# Patient Record
Sex: MALE | Race: WHITE | Employment: OTHER | URBAN - METROPOLITAN AREA
[De-identification: names, ages, dates, MRNs, and addresses within clinical notes are randomized per-mention and may not be internally consistent; named-entity substitution may affect disease eponyms.]

---

## 2017-08-29 PROBLEM — M43.16 SPONDYLOLISTHESIS OF LUMBAR REGION: Status: ACTIVE | Noted: 2017-08-29

## 2018-12-06 ENCOUNTER — TELEPHONE (OUTPATIENT)
Dept: SURGERY | Age: 63
End: 2018-12-06

## 2018-12-06 ENCOUNTER — INITIAL CONSULT (OUTPATIENT)
Dept: SURGERY | Age: 63
End: 2018-12-06
Payer: MEDICARE

## 2018-12-06 VITALS
HEIGHT: 72 IN | SYSTOLIC BLOOD PRESSURE: 147 MMHG | HEART RATE: 89 BPM | DIASTOLIC BLOOD PRESSURE: 84 MMHG | BODY MASS INDEX: 25.77 KG/M2

## 2018-12-06 DIAGNOSIS — K63.89 COLONIC MASS: Primary | ICD-10-CM

## 2018-12-06 PROCEDURE — 1123F ACP DISCUSS/DSCN MKR DOCD: CPT | Performed by: SURGERY

## 2018-12-06 PROCEDURE — 1101F PT FALLS ASSESS-DOCD LE1/YR: CPT | Performed by: SURGERY

## 2018-12-06 PROCEDURE — 4040F PNEUMOC VAC/ADMIN/RCVD: CPT | Performed by: SURGERY

## 2018-12-06 PROCEDURE — G8484 FLU IMMUNIZE NO ADMIN: HCPCS | Performed by: SURGERY

## 2018-12-06 PROCEDURE — 99204 OFFICE O/P NEW MOD 45 MIN: CPT | Performed by: SURGERY

## 2018-12-06 PROCEDURE — G8427 DOCREV CUR MEDS BY ELIG CLIN: HCPCS | Performed by: SURGERY

## 2018-12-06 PROCEDURE — 3017F COLORECTAL CA SCREEN DOC REV: CPT | Performed by: SURGERY

## 2018-12-06 PROCEDURE — G8417 CALC BMI ABV UP PARAM F/U: HCPCS | Performed by: SURGERY

## 2018-12-06 PROCEDURE — 4004F PT TOBACCO SCREEN RCVD TLK: CPT | Performed by: SURGERY

## 2018-12-06 RX ORDER — TRAMADOL HYDROCHLORIDE 50 MG/1
50 TABLET ORAL EVERY 6 HOURS PRN
COMMUNITY
End: 2020-02-19 | Stop reason: ALTCHOICE

## 2018-12-06 RX ORDER — LOSARTAN POTASSIUM 50 MG/1
50 TABLET ORAL DAILY
COMMUNITY

## 2018-12-06 RX ORDER — RANITIDINE 300 MG/1
300 CAPSULE ORAL EVERY EVENING
COMMUNITY

## 2018-12-06 RX ORDER — DIPHENHYDRAMINE HCL 25 MG
25 TABLET ORAL DAILY PRN
COMMUNITY

## 2018-12-06 RX ORDER — HYDROCODONE BITARTRATE AND ACETAMINOPHEN 10; 325 MG/1; MG/1
1 TABLET ORAL EVERY 6 HOURS PRN
COMMUNITY

## 2018-12-06 RX ORDER — PANTOPRAZOLE SODIUM 40 MG/1
40 TABLET, DELAYED RELEASE ORAL EVERY MORNING
COMMUNITY

## 2018-12-06 RX ORDER — ICOSAPENT ETHYL 1000 MG/1
2 CAPSULE ORAL 2 TIMES DAILY
COMMUNITY
End: 2020-02-19 | Stop reason: ALTCHOICE

## 2018-12-06 NOTE — PROGRESS NOTES
 Number of children: N/A    Years of education: N/A     Occupational History    Not on file.      Social History Main Topics    Smoking status: Current Every Day Smoker     Packs/day: 1.00     Years: 47.00     Types: Cigarettes, Cigars    Smokeless tobacco: Never Used      Comment: 1/2 pack daily/2 cigars daily    Alcohol use Yes      Comment: Moderate    Drug use: No    Sexual activity: Not on file     Other Topics Concern    Not on file     Social History Narrative    No narrative on file           Review of Systems  Review of Systems -  General ROS: negative for - chills, fatigue or malaise  ENT ROS: negative for - hearing change, nasal congestion or nasal discharge  Allergy and Immunology ROS: negative for - hives, itchy/watery eyes or nasal congestion  Hematological and Lymphatic ROS: negative for - blood clots, blood transfusions, bruising or fatigue  Endocrine ROS: negative for - malaise/lethargy, mood swings, palpitations or polydipsia/polyuria  Respiratory ROS: negative for - sputum changes, stridor, tachypnea or wheezing  Cardiovascular ROS: negative for - irregular heartbeat, loss of consciousness, murmur or orthopnea  Gastrointestinal ROS: negative for - constipation, diarrhea, gas/bloating, heartburn or hematemesis  Genito-Urinary ROS: negative for -  genital discharge, genital ulcers or hematuria  Musculoskeletal ROS: negative for - gait disturbance, muscle pain or muscular weakness    Physical exam:   BP (!) 147/84 (Site: Left Upper Arm, Position: Sitting, Cuff Size: Medium Adult)   Pulse 89   Ht 6' (1.829 m)   BMI 25.77 kg/m²   General appearance:  NAD  Pyscho/social: negative for tremors and hallucinations  Head: NCAT, PERRLA, EOMI, red conjunctiva  Neck: supple, no masses  Lungs: CTAB, equal chest rise bilateral  Heart: Reg rate  Abdomen: soft, nontender, nondistended  Skin; no lesions  Gu: no cva tenderness  Extremities: extremities normal, atraumatic, no cyanosis or

## 2018-12-10 ENCOUNTER — HOSPITAL ENCOUNTER (OUTPATIENT)
Age: 63
Discharge: HOME | End: 2018-12-10
Payer: MEDICARE

## 2018-12-10 ENCOUNTER — HOSPITAL ENCOUNTER (OUTPATIENT)
Dept: CT IMAGING | Age: 63
Discharge: HOME | End: 2018-12-10
Payer: MEDICARE

## 2018-12-10 DIAGNOSIS — K63.89 COLONIC MASS: ICD-10-CM

## 2018-12-10 LAB
ANION GAP SERPL CALCULATED.3IONS-SCNC: 11 MMOL/L (ref 7–16)
BUN BLDV-MCNC: 9 MG/DL (ref 8–23)
CALCIUM SERPL-MCNC: 9 MG/DL (ref 8.6–10.2)
CHLORIDE BLD-SCNC: 101 MMOL/L (ref 98–107)
CO2: 27 MMOL/L (ref 22–29)
CREAT SERPL-MCNC: 0.7 MG/DL (ref 0.7–1.2)
GFR AFRICAN AMERICAN: >60
GFR NON-AFRICAN AMERICAN: >60 ML/MIN/1.73
GLUCOSE BLD-MCNC: 257 MG/DL (ref 74–99)
POTASSIUM SERPL-SCNC: 3.9 MMOL/L (ref 3.5–5)
SODIUM BLD-SCNC: 139 MMOL/L (ref 132–146)

## 2018-12-10 PROCEDURE — 80048 BASIC METABOLIC PNL TOTAL CA: CPT

## 2018-12-10 PROCEDURE — 36415 COLL VENOUS BLD VENIPUNCTURE: CPT

## 2019-01-24 ENCOUNTER — OFFICE VISIT (OUTPATIENT)
Dept: SURGERY | Age: 64
End: 2019-01-24
Payer: MEDICARE

## 2019-01-24 VITALS
DIASTOLIC BLOOD PRESSURE: 84 MMHG | BODY MASS INDEX: 27.77 KG/M2 | OXYGEN SATURATION: 93 % | RESPIRATION RATE: 16 BRPM | TEMPERATURE: 98.1 F | HEIGHT: 72 IN | WEIGHT: 205 LBS | HEART RATE: 101 BPM | SYSTOLIC BLOOD PRESSURE: 142 MMHG

## 2019-01-24 DIAGNOSIS — C18.2 MALIGNANT NEOPLASM OF ASCENDING COLON (HCC): Primary | ICD-10-CM

## 2019-01-24 PROCEDURE — G8484 FLU IMMUNIZE NO ADMIN: HCPCS | Performed by: SURGERY

## 2019-01-24 PROCEDURE — 1123F ACP DISCUSS/DSCN MKR DOCD: CPT | Performed by: SURGERY

## 2019-01-24 PROCEDURE — G8428 CUR MEDS NOT DOCUMENT: HCPCS | Performed by: SURGERY

## 2019-01-24 PROCEDURE — G8417 CALC BMI ABV UP PARAM F/U: HCPCS | Performed by: SURGERY

## 2019-01-24 PROCEDURE — 99214 OFFICE O/P EST MOD 30 MIN: CPT | Performed by: SURGERY

## 2019-01-24 PROCEDURE — 1101F PT FALLS ASSESS-DOCD LE1/YR: CPT | Performed by: SURGERY

## 2019-01-24 PROCEDURE — 4040F PNEUMOC VAC/ADMIN/RCVD: CPT | Performed by: SURGERY

## 2019-01-24 PROCEDURE — 3017F COLORECTAL CA SCREEN DOC REV: CPT | Performed by: SURGERY

## 2019-01-24 PROCEDURE — 4004F PT TOBACCO SCREEN RCVD TLK: CPT | Performed by: SURGERY

## 2019-01-24 RX ORDER — ERGOCALCIFEROL 1.25 MG/1
CAPSULE ORAL
Refills: 11 | COMMUNITY
Start: 2019-01-03 | End: 2020-02-19 | Stop reason: ALTCHOICE

## 2019-01-24 RX ORDER — OMEPRAZOLE 40 MG/1
CAPSULE, DELAYED RELEASE ORAL
Refills: 4 | COMMUNITY
Start: 2019-01-03 | End: 2019-03-08

## 2019-03-04 ENCOUNTER — OFFICE VISIT (OUTPATIENT)
Dept: SURGERY | Age: 64
End: 2019-03-04
Payer: MEDICARE

## 2019-03-04 ENCOUNTER — TELEPHONE (OUTPATIENT)
Dept: SURGERY | Age: 64
End: 2019-03-04

## 2019-03-04 VITALS
WEIGHT: 190 LBS | TEMPERATURE: 97.8 F | RESPIRATION RATE: 18 BRPM | OXYGEN SATURATION: 94 % | HEART RATE: 97 BPM | SYSTOLIC BLOOD PRESSURE: 154 MMHG | BODY MASS INDEX: 25.73 KG/M2 | DIASTOLIC BLOOD PRESSURE: 78 MMHG | HEIGHT: 72 IN

## 2019-03-04 DIAGNOSIS — C18.2 MALIGNANT NEOPLASM OF ASCENDING COLON (HCC): Primary | ICD-10-CM

## 2019-03-04 PROCEDURE — 4004F PT TOBACCO SCREEN RCVD TLK: CPT | Performed by: SURGERY

## 2019-03-04 PROCEDURE — 4040F PNEUMOC VAC/ADMIN/RCVD: CPT | Performed by: SURGERY

## 2019-03-04 PROCEDURE — 3017F COLORECTAL CA SCREEN DOC REV: CPT | Performed by: SURGERY

## 2019-03-04 PROCEDURE — G8484 FLU IMMUNIZE NO ADMIN: HCPCS | Performed by: SURGERY

## 2019-03-04 PROCEDURE — 1123F ACP DISCUSS/DSCN MKR DOCD: CPT | Performed by: SURGERY

## 2019-03-04 PROCEDURE — 99213 OFFICE O/P EST LOW 20 MIN: CPT | Performed by: SURGERY

## 2019-03-04 PROCEDURE — 1101F PT FALLS ASSESS-DOCD LE1/YR: CPT | Performed by: SURGERY

## 2019-03-04 PROCEDURE — G8428 CUR MEDS NOT DOCUMENT: HCPCS | Performed by: SURGERY

## 2019-03-04 PROCEDURE — G8417 CALC BMI ABV UP PARAM F/U: HCPCS | Performed by: SURGERY

## 2019-03-04 RX ORDER — ERYTHROMYCIN 500 MG/1
500 TABLET, COATED ORAL 3 TIMES DAILY
Qty: 3 TABLET | Refills: 0 | Status: SHIPPED | OUTPATIENT
Start: 2019-03-04 | End: 2019-03-05

## 2019-03-04 RX ORDER — NEOMYCIN SULFATE 500 MG/1
1000 TABLET ORAL 3 TIMES DAILY
Qty: 6 TABLET | Refills: 0 | Status: SHIPPED | OUTPATIENT
Start: 2019-03-04 | End: 2019-03-05

## 2019-03-05 ENCOUNTER — TELEPHONE (OUTPATIENT)
Dept: SURGERY | Age: 64
End: 2019-03-05

## 2019-03-05 ENCOUNTER — PREP FOR PROCEDURE (OUTPATIENT)
Dept: SURGERY | Age: 64
End: 2019-03-05

## 2019-03-05 RX ORDER — SODIUM CHLORIDE, SODIUM LACTATE, POTASSIUM CHLORIDE, CALCIUM CHLORIDE 600; 310; 30; 20 MG/100ML; MG/100ML; MG/100ML; MG/100ML
INJECTION, SOLUTION INTRAVENOUS CONTINUOUS
Status: CANCELLED | OUTPATIENT
Start: 2019-03-05

## 2019-03-05 RX ORDER — SODIUM CHLORIDE 0.9 % (FLUSH) 0.9 %
10 SYRINGE (ML) INJECTION EVERY 12 HOURS SCHEDULED
Status: CANCELLED | OUTPATIENT
Start: 2019-03-05

## 2019-03-05 RX ORDER — SODIUM CHLORIDE 0.9 % (FLUSH) 0.9 %
10 SYRINGE (ML) INJECTION PRN
Status: CANCELLED | OUTPATIENT
Start: 2019-03-05

## 2019-03-06 ENCOUNTER — TELEPHONE (OUTPATIENT)
Dept: SURGERY | Age: 64
End: 2019-03-06

## 2019-03-08 ENCOUNTER — HOSPITAL ENCOUNTER (OUTPATIENT)
Dept: PREADMISSION TESTING | Age: 64
Discharge: HOME | End: 2019-03-08
Payer: MEDICARE

## 2019-03-08 VITALS
SYSTOLIC BLOOD PRESSURE: 120 MMHG | TEMPERATURE: 97.4 F | HEART RATE: 90 BPM | HEIGHT: 72 IN | DIASTOLIC BLOOD PRESSURE: 62 MMHG | WEIGHT: 207.13 LBS | BODY MASS INDEX: 28.05 KG/M2 | OXYGEN SATURATION: 95 % | RESPIRATION RATE: 18 BRPM

## 2019-03-08 DIAGNOSIS — Z01.818 PRE-OP TESTING: Primary | ICD-10-CM

## 2019-03-08 LAB
ANION GAP SERPL CALCULATED.3IONS-SCNC: 9 MMOL/L (ref 7–16)
BUN BLDV-MCNC: 9 MG/DL (ref 8–23)
CALCIUM SERPL-MCNC: 9.6 MG/DL (ref 8.6–10.2)
CHLORIDE BLD-SCNC: 99 MMOL/L (ref 98–107)
CO2: 28 MMOL/L (ref 22–29)
CREAT SERPL-MCNC: 0.7 MG/DL (ref 0.7–1.2)
GFR AFRICAN AMERICAN: >60
GFR NON-AFRICAN AMERICAN: >60 ML/MIN/1.73
GLUCOSE BLD-MCNC: 296 MG/DL (ref 74–99)
HCT VFR BLD CALC: 48.3 % (ref 37–54)
HEMOGLOBIN: 16.4 G/DL (ref 12.5–16.5)
MCH RBC QN AUTO: 29.5 PG (ref 26–35)
MCHC RBC AUTO-ENTMCNC: 34 % (ref 32–34.5)
MCV RBC AUTO: 87 FL (ref 80–99.9)
PDW BLD-RTO: 13.8 FL (ref 11.5–15)
PLATELET # BLD: 258 E9/L (ref 130–450)
PMV BLD AUTO: 9.3 FL (ref 7–12)
POTASSIUM REFLEX MAGNESIUM: 3.7 MMOL/L (ref 3.5–5)
RBC # BLD: 5.55 E12/L (ref 3.8–5.8)
SODIUM BLD-SCNC: 136 MMOL/L (ref 132–146)
WBC # BLD: 9.3 E9/L (ref 4.5–11.5)

## 2019-03-08 PROCEDURE — 87081 CULTURE SCREEN ONLY: CPT

## 2019-03-08 PROCEDURE — 36415 COLL VENOUS BLD VENIPUNCTURE: CPT

## 2019-03-08 PROCEDURE — 85027 COMPLETE CBC AUTOMATED: CPT

## 2019-03-08 PROCEDURE — 80048 BASIC METABOLIC PNL TOTAL CA: CPT

## 2019-03-08 RX ORDER — CYCLOBENZAPRINE HCL 10 MG
10 TABLET ORAL 3 TIMES DAILY PRN
COMMUNITY
End: 2019-09-11 | Stop reason: SDUPTHER

## 2019-03-08 ASSESSMENT — PAIN SCALES - GENERAL: PAINLEVEL_OUTOF10: 7

## 2019-03-08 ASSESSMENT — PAIN DESCRIPTION - LOCATION: LOCATION: ABDOMEN

## 2019-03-08 ASSESSMENT — PAIN DESCRIPTION - PROGRESSION: CLINICAL_PROGRESSION: GRADUALLY WORSENING

## 2019-03-08 ASSESSMENT — PAIN DESCRIPTION - ONSET: ONSET: ON-GOING

## 2019-03-08 ASSESSMENT — PAIN DESCRIPTION - FREQUENCY: FREQUENCY: CONTINUOUS

## 2019-03-08 ASSESSMENT — PAIN DESCRIPTION - DESCRIPTORS: DESCRIPTORS: ACHING;CONSTANT

## 2019-03-08 ASSESSMENT — PAIN - FUNCTIONAL ASSESSMENT: PAIN_FUNCTIONAL_ASSESSMENT: PREVENTS OR INTERFERES WITH MANY ACTIVE NOT PASSIVE ACTIVITIES

## 2019-03-08 ASSESSMENT — PAIN DESCRIPTION - ORIENTATION: ORIENTATION: LOWER;MID

## 2019-03-10 LAB — MRSA CULTURE ONLY: NORMAL

## 2019-03-12 ENCOUNTER — TELEPHONE (OUTPATIENT)
Dept: SURGERY | Age: 64
End: 2019-03-12

## 2019-03-12 LAB
EKG ATRIAL RATE: 88 BPM
EKG P AXIS: 60 DEGREES
EKG P-R INTERVAL: 206 MS
EKG Q-T INTERVAL: 388 MS
EKG QRS DURATION: 78 MS
EKG QTC CALCULATION (BAZETT): 469 MS
EKG R AXIS: -28 DEGREES
EKG T AXIS: 17 DEGREES
EKG VENTRICULAR RATE: 88 BPM

## 2019-03-27 ENCOUNTER — TELEPHONE (OUTPATIENT)
Dept: SURGERY | Age: 64
End: 2019-03-27

## 2019-04-18 ENCOUNTER — TELEPHONE (OUTPATIENT)
Dept: ADMINISTRATIVE | Age: 64
End: 2019-04-18

## 2019-04-25 ENCOUNTER — TELEPHONE (OUTPATIENT)
Dept: SURGERY | Age: 64
End: 2019-04-25

## 2019-04-25 NOTE — TELEPHONE ENCOUNTER
Patient has received medical and cardiac clearance for surgery. Call placed to patient to discuss scheduling surgery for May 7, 2019, patient not available. Message left for patient to please return my call to discuss scheduling surgery. Awaiting return call from patient.

## 2019-04-29 ENCOUNTER — PREP FOR PROCEDURE (OUTPATIENT)
Dept: SURGERY | Age: 64
End: 2019-04-29

## 2019-04-29 RX ORDER — SODIUM CHLORIDE 0.9 % (FLUSH) 0.9 %
10 SYRINGE (ML) INJECTION PRN
Status: CANCELLED | OUTPATIENT
Start: 2019-04-29

## 2019-04-29 RX ORDER — CIPROFLOXACIN 2 MG/ML
400 INJECTION, SOLUTION INTRAVENOUS
Status: CANCELLED | OUTPATIENT
Start: 2019-05-07 | End: 2019-05-07

## 2019-04-29 RX ORDER — SODIUM CHLORIDE 0.9 % (FLUSH) 0.9 %
10 SYRINGE (ML) INJECTION EVERY 12 HOURS SCHEDULED
Status: CANCELLED | OUTPATIENT
Start: 2019-04-29

## 2019-04-29 RX ORDER — SODIUM CHLORIDE, SODIUM LACTATE, POTASSIUM CHLORIDE, CALCIUM CHLORIDE 600; 310; 30; 20 MG/100ML; MG/100ML; MG/100ML; MG/100ML
INJECTION, SOLUTION INTRAVENOUS CONTINUOUS
Status: CANCELLED | OUTPATIENT
Start: 2019-04-29

## 2019-05-03 ENCOUNTER — HOSPITAL ENCOUNTER (OUTPATIENT)
Dept: PREADMISSION TESTING | Age: 64
Discharge: HOME | End: 2019-05-03
Payer: MEDICARE

## 2019-05-03 VITALS
SYSTOLIC BLOOD PRESSURE: 122 MMHG | BODY MASS INDEX: 28.08 KG/M2 | WEIGHT: 207.3 LBS | DIASTOLIC BLOOD PRESSURE: 68 MMHG | TEMPERATURE: 98.1 F | OXYGEN SATURATION: 95 % | HEART RATE: 92 BPM | RESPIRATION RATE: 18 BRPM | HEIGHT: 72 IN

## 2019-05-03 DIAGNOSIS — Z01.818 PRE-OP TESTING: Primary | ICD-10-CM

## 2019-05-03 LAB
ANION GAP SERPL CALCULATED.3IONS-SCNC: 12 MMOL/L (ref 7–16)
BUN BLDV-MCNC: 18 MG/DL (ref 8–23)
CALCIUM SERPL-MCNC: 9.4 MG/DL (ref 8.6–10.2)
CHLORIDE BLD-SCNC: 102 MMOL/L (ref 98–107)
CO2: 25 MMOL/L (ref 22–29)
CREAT SERPL-MCNC: 0.9 MG/DL (ref 0.7–1.2)
GFR AFRICAN AMERICAN: >60
GFR NON-AFRICAN AMERICAN: >60 ML/MIN/1.73
GLUCOSE BLD-MCNC: 270 MG/DL (ref 74–99)
HCT VFR BLD CALC: 47.3 % (ref 37–54)
HEMOGLOBIN: 16.2 G/DL (ref 12.5–16.5)
MCH RBC QN AUTO: 29.8 PG (ref 26–35)
MCHC RBC AUTO-ENTMCNC: 34.2 % (ref 32–34.5)
MCV RBC AUTO: 87.1 FL (ref 80–99.9)
PDW BLD-RTO: 14.2 FL (ref 11.5–15)
PLATELET # BLD: 251 E9/L (ref 130–450)
PMV BLD AUTO: 9.7 FL (ref 7–12)
POTASSIUM REFLEX MAGNESIUM: 4.3 MMOL/L (ref 3.5–5)
RBC # BLD: 5.43 E12/L (ref 3.8–5.8)
SODIUM BLD-SCNC: 139 MMOL/L (ref 132–146)
WBC # BLD: 9.4 E9/L (ref 4.5–11.5)

## 2019-05-03 PROCEDURE — 80048 BASIC METABOLIC PNL TOTAL CA: CPT

## 2019-05-03 PROCEDURE — 85027 COMPLETE CBC AUTOMATED: CPT

## 2019-05-03 PROCEDURE — 36415 COLL VENOUS BLD VENIPUNCTURE: CPT

## 2019-05-03 PROCEDURE — 87081 CULTURE SCREEN ONLY: CPT

## 2019-05-03 ASSESSMENT — PAIN DESCRIPTION - PAIN TYPE: TYPE: CHRONIC PAIN

## 2019-05-03 ASSESSMENT — PAIN DESCRIPTION - LOCATION: LOCATION: ABDOMEN

## 2019-05-03 ASSESSMENT — PAIN SCALES - GENERAL: PAINLEVEL_OUTOF10: 7

## 2019-05-03 ASSESSMENT — PAIN DESCRIPTION - ORIENTATION: ORIENTATION: RIGHT;LEFT;LOWER

## 2019-05-03 NOTE — PROGRESS NOTES
[address] [facility]                                                                                                                     [address]. Date: 5/3/2019    Date of surgery: 5/7/19   Arrival Time: Hospital will call you between 5pm and 7pm with your final arrival time for surgery    1. Do not eat or drink anything after midnight prior to surgery. This includes no water, chewing gum, mints or ice chips. 2. Take the following medications with a small sip of water on the morning of Surgery: waldaryl, tramadol, daliresp      3. Diabetics may take evening dose of insulin but none after midnight. If you feel symptomatic or low blood sugar morning of surgery drink 1-2 ounces of apple juice only. 4. Aspirin, Ibuprofen, Advil, Naproxen, Vitamin E and other Anti-inflammatory products should be stopped  before surgery  as directed by your physician. Take Tylenol only unless instructed otherwise by your surgeon. 5. Check with your Doctor regarding stopping Plavix, Coumadin, Lovenox, Eliquis, Effient, or other blood thinners. 6. Do not smoke,use illicit drugs and do not drink any alcoholic beverages 24 hours prior to surgery. 7. You may brush your teeth the morning of surgery. DO NOT SWALLOW WATER    8. You MUST make arrangements for a responsible adult to take you home after your surgery. You will not be allowed to leave alone or drive yourself home. It is strongly suggested someone stay with you the first 24 hrs. Your surgery will be cancelled if you do not have a ride home. 9. PEDIATRIC PATIENTS ONLY:  A parent/legal guardian must accompany a child scheduled for surgery and plan to stay at the hospital until the child is discharged. Please do not bring other children with you.     10. Please wear simple, loose fitting clothing to the hospital.  Do not bring valuables (money, credit cards, checkbooks, etc.) Do not wear any makeup (including no eye makeup) or nail polish on your fingers or toes. 11. DO NOT wear any jewelry or piercings on day of surgery. All body piercing jewelry must be removed. 12. Shower the night before surgery with _X__Antibacterial soap /MELINA WIPES________    13. TOTAL JOINT REPLACEMENT/HYSTERECTOMY PATIENTS ONLY---Remember to bring Blood Bank bracelet to the hospital on the day of surgery. 14. If you have a Living Will and Durable Power of  for Healthcare, please bring in a copy. 15. If appropriate bring crutches, inspirex, WALKER, CANE etc... 12. Notify your Surgeon if you develop any illness between now and surgery time, cough, cold, fever, sore throat, nausea, vomiting, etc.  Please notify your surgeon if you experience dizziness, shortness of breath or blurred vision between now & the time of your surgery. 17. If you have ___dentures, they will be removed before going to the OR; we will provide you a container. If you wear ___contact lenses or ___glasses, they will be removed; please bring a case for them. 18. To provide excellent care visitors will be limited to 2 in the room at any given time. 19. Please bring picture ID and insurance card. 20. Sleep apnea patients need to bring CPAP AND SETTINGS to hospital on day of surgery. 21. During flu season no children under the age of 15 are permitted in the hospital for the safety of all patients. 22. Other Come in through main entrance, go to information desk. Please call AMBULATORY CARE if you have any further questions.    [address]     [address]

## 2019-05-04 LAB — MRSA CULTURE ONLY: NORMAL

## 2019-05-07 ENCOUNTER — ANESTHESIA (OUTPATIENT)
Dept: OPERATING ROOM | Age: 64
DRG: 330 | End: 2019-05-07
Payer: MEDICARE

## 2019-05-07 ENCOUNTER — ANESTHESIA EVENT (OUTPATIENT)
Dept: OPERATING ROOM | Age: 64
DRG: 330 | End: 2019-05-07
Payer: MEDICARE

## 2019-05-07 ENCOUNTER — HOSPITAL ENCOUNTER (INPATIENT)
Age: 64
LOS: 2 days | Discharge: HOME | DRG: 330 | End: 2019-05-09
Attending: SURGERY | Admitting: SURGERY
Payer: MEDICARE

## 2019-05-07 VITALS
TEMPERATURE: 96.3 F | DIASTOLIC BLOOD PRESSURE: 72 MMHG | RESPIRATION RATE: 5 BRPM | SYSTOLIC BLOOD PRESSURE: 147 MMHG | OXYGEN SATURATION: 87 %

## 2019-05-07 PROBLEM — Z90.49 S/P COLON RESECTION: Status: ACTIVE | Noted: 2019-05-07

## 2019-05-07 LAB
METER GLUCOSE: 160 MG/DL (ref 74–99)
METER GLUCOSE: 303 MG/DL (ref 74–99)
METER GLUCOSE: 388 MG/DL (ref 74–99)
METER GLUCOSE: 450 MG/DL (ref 74–99)

## 2019-05-07 PROCEDURE — 1200000000 HC SEMI PRIVATE

## 2019-05-07 PROCEDURE — 3600000004 HC SURGERY LEVEL 4 BASE: Performed by: SURGERY

## 2019-05-07 PROCEDURE — 94640 AIRWAY INHALATION TREATMENT: CPT

## 2019-05-07 PROCEDURE — 6370000000 HC RX 637 (ALT 250 FOR IP): Performed by: INTERNAL MEDICINE

## 2019-05-07 PROCEDURE — 6370000000 HC RX 637 (ALT 250 FOR IP): Performed by: SURGERY

## 2019-05-07 PROCEDURE — 3700000001 HC ADD 15 MINUTES (ANESTHESIA): Performed by: SURGERY

## 2019-05-07 PROCEDURE — 6360000002 HC RX W HCPCS

## 2019-05-07 PROCEDURE — 82962 GLUCOSE BLOOD TEST: CPT

## 2019-05-07 PROCEDURE — 2500000003 HC RX 250 WO HCPCS: Performed by: SURGERY

## 2019-05-07 PROCEDURE — 6360000002 HC RX W HCPCS: Performed by: SURGERY

## 2019-05-07 PROCEDURE — 6360000002 HC RX W HCPCS: Performed by: NURSE ANESTHETIST, CERTIFIED REGISTERED

## 2019-05-07 PROCEDURE — 99024 POSTOP FOLLOW-UP VISIT: CPT | Performed by: SURGERY

## 2019-05-07 PROCEDURE — 88309 TISSUE EXAM BY PATHOLOGIST: CPT

## 2019-05-07 PROCEDURE — 7100000001 HC PACU RECOVERY - ADDTL 15 MIN: Performed by: SURGERY

## 2019-05-07 PROCEDURE — 3700000000 HC ANESTHESIA ATTENDED CARE: Performed by: SURGERY

## 2019-05-07 PROCEDURE — 7100000000 HC PACU RECOVERY - FIRST 15 MIN: Performed by: SURGERY

## 2019-05-07 PROCEDURE — 2500000003 HC RX 250 WO HCPCS: Performed by: NURSE ANESTHETIST, CERTIFIED REGISTERED

## 2019-05-07 PROCEDURE — 6370000000 HC RX 637 (ALT 250 FOR IP): Performed by: ANESTHESIOLOGY

## 2019-05-07 PROCEDURE — 2580000003 HC RX 258: Performed by: SURGERY

## 2019-05-07 PROCEDURE — 3600000014 HC SURGERY LEVEL 4 ADDTL 15MIN: Performed by: SURGERY

## 2019-05-07 PROCEDURE — 2709999900 HC NON-CHARGEABLE SUPPLY: Performed by: SURGERY

## 2019-05-07 PROCEDURE — 2720000010 HC SURG SUPPLY STERILE: Performed by: SURGERY

## 2019-05-07 RX ORDER — ONDANSETRON 2 MG/ML
4 INJECTION INTRAMUSCULAR; INTRAVENOUS EVERY 6 HOURS PRN
Status: DISCONTINUED | OUTPATIENT
Start: 2019-05-07 | End: 2019-05-09 | Stop reason: HOSPADM

## 2019-05-07 RX ORDER — ASPIRIN 81 MG/1
81 TABLET ORAL DAILY
Status: DISCONTINUED | OUTPATIENT
Start: 2019-05-07 | End: 2019-05-09 | Stop reason: HOSPADM

## 2019-05-07 RX ORDER — IPRATROPIUM BROMIDE AND ALBUTEROL SULFATE 2.5; .5 MG/3ML; MG/3ML
1 SOLUTION RESPIRATORY (INHALATION) ONCE
Status: COMPLETED | OUTPATIENT
Start: 2019-05-07 | End: 2019-05-07

## 2019-05-07 RX ORDER — ONDANSETRON 2 MG/ML
INJECTION INTRAMUSCULAR; INTRAVENOUS PRN
Status: DISCONTINUED | OUTPATIENT
Start: 2019-05-07 | End: 2019-05-07 | Stop reason: SDUPTHER

## 2019-05-07 RX ORDER — SODIUM CHLORIDE 0.9 % (FLUSH) 0.9 %
10 SYRINGE (ML) INJECTION EVERY 12 HOURS SCHEDULED
Status: DISCONTINUED | OUTPATIENT
Start: 2019-05-07 | End: 2019-05-09 | Stop reason: HOSPADM

## 2019-05-07 RX ORDER — HYDROCODONE BITARTRATE AND ACETAMINOPHEN 5; 325 MG/1; MG/1
1 TABLET ORAL EVERY 4 HOURS PRN
Status: DISCONTINUED | OUTPATIENT
Start: 2019-05-07 | End: 2019-05-09 | Stop reason: HOSPADM

## 2019-05-07 RX ORDER — SODIUM CHLORIDE 0.9 % (FLUSH) 0.9 %
10 SYRINGE (ML) INJECTION PRN
Status: DISCONTINUED | OUTPATIENT
Start: 2019-05-07 | End: 2019-05-09 | Stop reason: HOSPADM

## 2019-05-07 RX ORDER — CIPROFLOXACIN 2 MG/ML
400 INJECTION, SOLUTION INTRAVENOUS EVERY 12 HOURS
Status: COMPLETED | OUTPATIENT
Start: 2019-05-07 | End: 2019-05-07

## 2019-05-07 RX ORDER — AMLODIPINE BESYLATE 10 MG/1
10 TABLET ORAL DAILY
Status: DISCONTINUED | OUTPATIENT
Start: 2019-05-07 | End: 2019-05-09 | Stop reason: HOSPADM

## 2019-05-07 RX ORDER — GLYCOPYRROLATE 1 MG/5 ML
SYRINGE (ML) INTRAVENOUS PRN
Status: DISCONTINUED | OUTPATIENT
Start: 2019-05-07 | End: 2019-05-07 | Stop reason: SDUPTHER

## 2019-05-07 RX ORDER — SODIUM CHLORIDE, SODIUM LACTATE, POTASSIUM CHLORIDE, CALCIUM CHLORIDE 600; 310; 30; 20 MG/100ML; MG/100ML; MG/100ML; MG/100ML
INJECTION, SOLUTION INTRAVENOUS CONTINUOUS
Status: DISCONTINUED | OUTPATIENT
Start: 2019-05-07 | End: 2019-05-07

## 2019-05-07 RX ORDER — SODIUM CHLORIDE, SODIUM LACTATE, POTASSIUM CHLORIDE, CALCIUM CHLORIDE 600; 310; 30; 20 MG/100ML; MG/100ML; MG/100ML; MG/100ML
INJECTION, SOLUTION INTRAVENOUS CONTINUOUS
Status: DISCONTINUED | OUTPATIENT
Start: 2019-05-07 | End: 2019-05-08

## 2019-05-07 RX ORDER — ATORVASTATIN CALCIUM 40 MG/1
40 TABLET, FILM COATED ORAL NIGHTLY
Status: DISCONTINUED | OUTPATIENT
Start: 2019-05-07 | End: 2019-05-09 | Stop reason: HOSPADM

## 2019-05-07 RX ORDER — CIPROFLOXACIN 2 MG/ML
400 INJECTION, SOLUTION INTRAVENOUS
Status: COMPLETED | OUTPATIENT
Start: 2019-05-07 | End: 2019-05-07

## 2019-05-07 RX ORDER — MIDAZOLAM HYDROCHLORIDE 1 MG/ML
INJECTION INTRAMUSCULAR; INTRAVENOUS PRN
Status: DISCONTINUED | OUTPATIENT
Start: 2019-05-07 | End: 2019-05-07 | Stop reason: SDUPTHER

## 2019-05-07 RX ORDER — HYDROMORPHONE HYDROCHLORIDE 1 MG/ML
0.5 INJECTION, SOLUTION INTRAMUSCULAR; INTRAVENOUS; SUBCUTANEOUS EVERY 5 MIN PRN
Status: DISCONTINUED | OUTPATIENT
Start: 2019-05-07 | End: 2019-05-07 | Stop reason: HOSPADM

## 2019-05-07 RX ORDER — DEXTROSE MONOHYDRATE 25 G/50ML
12.5 INJECTION, SOLUTION INTRAVENOUS PRN
Status: DISCONTINUED | OUTPATIENT
Start: 2019-05-07 | End: 2019-05-09 | Stop reason: HOSPADM

## 2019-05-07 RX ORDER — MORPHINE SULFATE 2 MG/ML
2 INJECTION, SOLUTION INTRAMUSCULAR; INTRAVENOUS
Status: DISCONTINUED | OUTPATIENT
Start: 2019-05-07 | End: 2019-05-09 | Stop reason: HOSPADM

## 2019-05-07 RX ORDER — DEXTROSE MONOHYDRATE 50 MG/ML
100 INJECTION, SOLUTION INTRAVENOUS PRN
Status: DISCONTINUED | OUTPATIENT
Start: 2019-05-07 | End: 2019-05-09 | Stop reason: HOSPADM

## 2019-05-07 RX ORDER — LOSARTAN POTASSIUM 50 MG/1
50 TABLET ORAL DAILY
Status: DISCONTINUED | OUTPATIENT
Start: 2019-05-07 | End: 2019-05-09 | Stop reason: HOSPADM

## 2019-05-07 RX ORDER — BUPIVACAINE HYDROCHLORIDE AND EPINEPHRINE 2.5; 5 MG/ML; UG/ML
INJECTION, SOLUTION EPIDURAL; INFILTRATION; INTRACAUDAL; PERINEURAL PRN
Status: DISCONTINUED | OUTPATIENT
Start: 2019-05-07 | End: 2019-05-07 | Stop reason: ALTCHOICE

## 2019-05-07 RX ORDER — ICOSAPENT ETHYL 1000 MG/1
2 CAPSULE ORAL 2 TIMES DAILY
Status: DISCONTINUED | OUTPATIENT
Start: 2019-05-07 | End: 2019-05-07 | Stop reason: CLARIF

## 2019-05-07 RX ORDER — ROCURONIUM BROMIDE 10 MG/ML
INJECTION, SOLUTION INTRAVENOUS PRN
Status: DISCONTINUED | OUTPATIENT
Start: 2019-05-07 | End: 2019-05-07 | Stop reason: SDUPTHER

## 2019-05-07 RX ORDER — IPRATROPIUM BROMIDE AND ALBUTEROL SULFATE 2.5; .5 MG/3ML; MG/3ML
1 SOLUTION RESPIRATORY (INHALATION)
Status: DISCONTINUED | OUTPATIENT
Start: 2019-05-07 | End: 2019-05-09 | Stop reason: HOSPADM

## 2019-05-07 RX ORDER — CYCLOBENZAPRINE HCL 10 MG
10 TABLET ORAL 3 TIMES DAILY PRN
Status: DISCONTINUED | OUTPATIENT
Start: 2019-05-07 | End: 2019-05-09 | Stop reason: HOSPADM

## 2019-05-07 RX ORDER — HYDROCODONE BITARTRATE AND ACETAMINOPHEN 5; 325 MG/1; MG/1
2 TABLET ORAL EVERY 4 HOURS PRN
Status: DISCONTINUED | OUTPATIENT
Start: 2019-05-07 | End: 2019-05-09 | Stop reason: HOSPADM

## 2019-05-07 RX ORDER — LIDOCAINE HYDROCHLORIDE 20 MG/ML
INJECTION, SOLUTION INFILTRATION; PERINEURAL PRN
Status: DISCONTINUED | OUTPATIENT
Start: 2019-05-07 | End: 2019-05-07 | Stop reason: SDUPTHER

## 2019-05-07 RX ORDER — NICOTINE POLACRILEX 4 MG
15 LOZENGE BUCCAL PRN
Status: DISCONTINUED | OUTPATIENT
Start: 2019-05-07 | End: 2019-05-09 | Stop reason: HOSPADM

## 2019-05-07 RX ORDER — SODIUM CHLORIDE 0.9 % (FLUSH) 0.9 %
10 SYRINGE (ML) INJECTION EVERY 12 HOURS SCHEDULED
Status: DISCONTINUED | OUTPATIENT
Start: 2019-05-07 | End: 2019-05-07 | Stop reason: HOSPADM

## 2019-05-07 RX ORDER — DEXAMETHASONE SODIUM PHOSPHATE 10 MG/ML
INJECTION, SOLUTION INTRAMUSCULAR; INTRAVENOUS PRN
Status: DISCONTINUED | OUTPATIENT
Start: 2019-05-07 | End: 2019-05-07 | Stop reason: SDUPTHER

## 2019-05-07 RX ORDER — GABAPENTIN 400 MG/1
800 CAPSULE ORAL 3 TIMES DAILY
Status: DISCONTINUED | OUTPATIENT
Start: 2019-05-07 | End: 2019-05-09 | Stop reason: HOSPADM

## 2019-05-07 RX ORDER — CIPROFLOXACIN 2 MG/ML
INJECTION, SOLUTION INTRAVENOUS PRN
Status: DISCONTINUED | OUTPATIENT
Start: 2019-05-07 | End: 2019-05-07 | Stop reason: SDUPTHER

## 2019-05-07 RX ORDER — FENTANYL CITRATE 50 UG/ML
INJECTION, SOLUTION INTRAMUSCULAR; INTRAVENOUS PRN
Status: DISCONTINUED | OUTPATIENT
Start: 2019-05-07 | End: 2019-05-07 | Stop reason: SDUPTHER

## 2019-05-07 RX ORDER — MEPERIDINE HYDROCHLORIDE 50 MG/ML
12.5 INJECTION INTRAMUSCULAR; INTRAVENOUS; SUBCUTANEOUS EVERY 5 MIN PRN
Status: DISCONTINUED | OUTPATIENT
Start: 2019-05-07 | End: 2019-05-07 | Stop reason: HOSPADM

## 2019-05-07 RX ORDER — PANTOPRAZOLE SODIUM 40 MG/1
40 TABLET, DELAYED RELEASE ORAL
Status: DISCONTINUED | OUTPATIENT
Start: 2019-05-08 | End: 2019-05-09 | Stop reason: HOSPADM

## 2019-05-07 RX ORDER — OMEGA-3-ACID ETHYL ESTERS 1 G/1
2 CAPSULE, LIQUID FILLED ORAL 2 TIMES DAILY
Status: DISCONTINUED | OUTPATIENT
Start: 2019-05-07 | End: 2019-05-09 | Stop reason: HOSPADM

## 2019-05-07 RX ORDER — NEOSTIGMINE METHYLSULFATE 1 MG/ML
INJECTION, SOLUTION INTRAVENOUS PRN
Status: DISCONTINUED | OUTPATIENT
Start: 2019-05-07 | End: 2019-05-07 | Stop reason: SDUPTHER

## 2019-05-07 RX ORDER — SODIUM CHLORIDE 0.9 % (FLUSH) 0.9 %
10 SYRINGE (ML) INJECTION PRN
Status: DISCONTINUED | OUTPATIENT
Start: 2019-05-07 | End: 2019-05-07 | Stop reason: HOSPADM

## 2019-05-07 RX ORDER — ACETAMINOPHEN 325 MG/1
650 TABLET ORAL EVERY 4 HOURS PRN
Status: DISCONTINUED | OUTPATIENT
Start: 2019-05-07 | End: 2019-05-09 | Stop reason: HOSPADM

## 2019-05-07 RX ORDER — PROPOFOL 10 MG/ML
INJECTION, EMULSION INTRAVENOUS PRN
Status: DISCONTINUED | OUTPATIENT
Start: 2019-05-07 | End: 2019-05-07 | Stop reason: SDUPTHER

## 2019-05-07 RX ORDER — MORPHINE SULFATE 4 MG/ML
4 INJECTION, SOLUTION INTRAMUSCULAR; INTRAVENOUS EVERY 4 HOURS PRN
Status: DISCONTINUED | OUTPATIENT
Start: 2019-05-07 | End: 2019-05-09 | Stop reason: HOSPADM

## 2019-05-07 RX ADMIN — Medication 3 MG: at 11:00

## 2019-05-07 RX ADMIN — Medication 0.6 MG: at 11:00

## 2019-05-07 RX ADMIN — INSULIN LISPRO 4 UNITS: 100 INJECTION, SOLUTION INTRAVENOUS; SUBCUTANEOUS at 14:44

## 2019-05-07 RX ADMIN — AMLODIPINE BESYLATE 10 MG: 10 TABLET ORAL at 14:43

## 2019-05-07 RX ADMIN — ONDANSETRON HYDROCHLORIDE 4 MG: 2 INJECTION, SOLUTION INTRAMUSCULAR; INTRAVENOUS at 10:55

## 2019-05-07 RX ADMIN — Medication 50 MG: at 09:53

## 2019-05-07 RX ADMIN — METRONIDAZOLE 500 MG: 500 INJECTION, SOLUTION INTRAVENOUS at 09:50

## 2019-05-07 RX ADMIN — GABAPENTIN 800 MG: 400 CAPSULE ORAL at 20:55

## 2019-05-07 RX ADMIN — IPRATROPIUM BROMIDE AND ALBUTEROL SULFATE 1 AMPULE: .5; 3 SOLUTION RESPIRATORY (INHALATION) at 11:30

## 2019-05-07 RX ADMIN — IPRATROPIUM BROMIDE AND ALBUTEROL SULFATE 1 AMPULE: .5; 3 SOLUTION RESPIRATORY (INHALATION) at 19:19

## 2019-05-07 RX ADMIN — HYDROMORPHONE HYDROCHLORIDE 0.5 MG: 1 INJECTION, SOLUTION INTRAMUSCULAR; INTRAVENOUS; SUBCUTANEOUS at 11:40

## 2019-05-07 RX ADMIN — ATORVASTATIN CALCIUM 40 MG: 40 TABLET, FILM COATED ORAL at 20:55

## 2019-05-07 RX ADMIN — DEXAMETHASONE SODIUM PHOSPHATE 10 MG: 10 INJECTION, SOLUTION INTRAMUSCULAR; INTRAVENOUS at 09:57

## 2019-05-07 RX ADMIN — OMEGA-3-ACID ETHYL ESTERS 2 G: 1 CAPSULE, LIQUID FILLED ORAL at 20:55

## 2019-05-07 RX ADMIN — LIDOCAINE HYDROCHLORIDE 60 MG: 20 INJECTION, SOLUTION INFILTRATION; PERINEURAL at 09:53

## 2019-05-07 RX ADMIN — INSULIN LISPRO 5 UNITS: 100 INJECTION, SOLUTION INTRAVENOUS; SUBCUTANEOUS at 17:03

## 2019-05-07 RX ADMIN — METRONIDAZOLE 500 MG: 500 INJECTION, SOLUTION INTRAVENOUS at 17:13

## 2019-05-07 RX ADMIN — FENTANYL CITRATE 100 MCG: 50 INJECTION, SOLUTION INTRAMUSCULAR; INTRAVENOUS at 09:53

## 2019-05-07 RX ADMIN — INSULIN LISPRO 3 UNITS: 100 INJECTION, SOLUTION INTRAVENOUS; SUBCUTANEOUS at 20:56

## 2019-05-07 RX ADMIN — METFORMIN HYDROCHLORIDE 1000 MG: 1000 TABLET ORAL at 14:43

## 2019-05-07 RX ADMIN — METFORMIN HYDROCHLORIDE 1000 MG: 1000 TABLET ORAL at 20:55

## 2019-05-07 RX ADMIN — IPRATROPIUM BROMIDE AND ALBUTEROL SULFATE 1 AMPULE: .5; 3 SOLUTION RESPIRATORY (INHALATION) at 15:23

## 2019-05-07 RX ADMIN — CIPROFLOXACIN 400 MG: 2 INJECTION, SOLUTION INTRAVENOUS at 10:50

## 2019-05-07 RX ADMIN — MORPHINE SULFATE 4 MG: 4 INJECTION INTRAVENOUS at 17:03

## 2019-05-07 RX ADMIN — LOSARTAN POTASSIUM 50 MG: 50 TABLET ORAL at 14:44

## 2019-05-07 RX ADMIN — PROPOFOL 150 MG: 10 INJECTION, EMULSION INTRAVENOUS at 09:53

## 2019-05-07 RX ADMIN — Medication 20 MG: at 10:37

## 2019-05-07 RX ADMIN — CIPROFLOXACIN 400 MG: 2 INJECTION, SOLUTION INTRAVENOUS at 20:56

## 2019-05-07 RX ADMIN — HYDROCODONE BITARTRATE AND ACETAMINOPHEN 2 TABLET: 5; 325 TABLET ORAL at 20:55

## 2019-05-07 RX ADMIN — SODIUM CHLORIDE, POTASSIUM CHLORIDE, SODIUM LACTATE AND CALCIUM CHLORIDE: 600; 310; 30; 20 INJECTION, SOLUTION INTRAVENOUS at 09:31

## 2019-05-07 RX ADMIN — SODIUM CHLORIDE, POTASSIUM CHLORIDE, SODIUM LACTATE AND CALCIUM CHLORIDE: 600; 310; 30; 20 INJECTION, SOLUTION INTRAVENOUS at 13:13

## 2019-05-07 RX ADMIN — CIPROFLOXACIN 400 MG: 2 INJECTION, SOLUTION INTRAVENOUS at 09:34

## 2019-05-07 RX ADMIN — MIDAZOLAM 2 MG: 1 INJECTION INTRAMUSCULAR; INTRAVENOUS at 09:50

## 2019-05-07 RX ADMIN — GABAPENTIN 800 MG: 400 CAPSULE ORAL at 14:43

## 2019-05-07 ASSESSMENT — PULMONARY FUNCTION TESTS
PIF_VALUE: 23
PIF_VALUE: 28
PIF_VALUE: 22
PIF_VALUE: 17
PIF_VALUE: 23
PIF_VALUE: 22
PIF_VALUE: 20
PIF_VALUE: 28
PIF_VALUE: 33
PIF_VALUE: 20
PIF_VALUE: 23
PIF_VALUE: 22
PIF_VALUE: 27
PIF_VALUE: 23
PIF_VALUE: 28
PIF_VALUE: 28
PIF_VALUE: 1
PIF_VALUE: 23
PIF_VALUE: 23
PIF_VALUE: 28
PIF_VALUE: 21
PIF_VALUE: 27
PIF_VALUE: 28
PIF_VALUE: 24
PIF_VALUE: 36
PIF_VALUE: 1
PIF_VALUE: 23
PIF_VALUE: 23
PIF_VALUE: 41
PIF_VALUE: 15
PIF_VALUE: 27
PIF_VALUE: 28
PIF_VALUE: 22
PIF_VALUE: 23
PIF_VALUE: 23
PIF_VALUE: 21
PIF_VALUE: 20
PIF_VALUE: 21
PIF_VALUE: 27
PIF_VALUE: 20
PIF_VALUE: 38
PIF_VALUE: 30
PIF_VALUE: 23
PIF_VALUE: 22
PIF_VALUE: 3
PIF_VALUE: 27
PIF_VALUE: 22
PIF_VALUE: 0
PIF_VALUE: 15
PIF_VALUE: 25
PIF_VALUE: 23
PIF_VALUE: 1
PIF_VALUE: 0
PIF_VALUE: 23
PIF_VALUE: 37
PIF_VALUE: 28
PIF_VALUE: 22
PIF_VALUE: 23
PIF_VALUE: 27
PIF_VALUE: 26
PIF_VALUE: 23
PIF_VALUE: 23
PIF_VALUE: 38
PIF_VALUE: 2
PIF_VALUE: 21
PIF_VALUE: 44
PIF_VALUE: 20
PIF_VALUE: 23
PIF_VALUE: 39
PIF_VALUE: 27
PIF_VALUE: 21

## 2019-05-07 ASSESSMENT — PAIN DESCRIPTION - LOCATION
LOCATION: ABDOMEN
LOCATION: ABDOMEN

## 2019-05-07 ASSESSMENT — PAIN DESCRIPTION - PAIN TYPE
TYPE: SURGICAL PAIN
TYPE: SURGICAL PAIN

## 2019-05-07 ASSESSMENT — PAIN SCALES - GENERAL
PAINLEVEL_OUTOF10: 6
PAINLEVEL_OUTOF10: 9
PAINLEVEL_OUTOF10: 8
PAINLEVEL_OUTOF10: 0
PAINLEVEL_OUTOF10: 7

## 2019-05-07 ASSESSMENT — PAIN DESCRIPTION - DESCRIPTORS: DESCRIPTORS: ACHING;DISCOMFORT;TENDER

## 2019-05-07 ASSESSMENT — LIFESTYLE VARIABLES: SMOKING_STATUS: 1

## 2019-05-07 NOTE — H&P
General Surgery History and Physical     Patient's Name/Date of Birth: Richelle Freeman. / 1955     Date: 5/7/19      Surgeon: John Mckenzie M.D.     PCP: Zenobia Way MD     Chief Complaint: colon cancer     HPI:   Richelle Palma is a 75 y.o. male who presents for evaluation of colon cancer that was found on recent colonoscopy and is located at ascending colon. Does not have other symptoms.         Past Medical History        Past Medical History:   Diagnosis Date    Bilateral lower extremity edema      Chronic back pain      Depression      Diabetes mellitus (HCC)      GERD (gastroesophageal reflux disease)      Headache(784.0)      Hyperlipidemia      Hypertension      Migraines      Neck pain, chronic      Numbness and tingling       neck & back    Osteoarthritis      Type II or unspecified type diabetes mellitus without mention of complication, not stated as uncontrolled      Unspecified sleep apnea       CPAP at times    Vitamin D deficiency              Past Surgical History   Past Surgical History:   Procedure Laterality Date    BACK SURGERY   37 yrs ago     [facility]5 discs[facility] Dian[facility] OTHER SURGICAL HISTORY   2/15/12     vocal cord  surgery  by Dr. Radha Terrazas at [address] N/A 1/29/2014     Scar revision with flap reapir of left nasal tip    RHINOPLASTY        ROTATOR CUFF REPAIR         [facility] cuff at [facility] by Dr. Silver Gruber            Current Facility-Administered Medications          Current Outpatient Medications   Medication Sig Dispense Refill    neomycin (MYCIFRADIN) 500 MG tablet Take 2 tablets by mouth 3 times daily for 1 day Take at 1300, 1400 and midnight the day before surgery, use these instructions instead of the ones above.  6 tablet 0    erythromycin base (E-MYCIN) 500 MG tablet Take 1 tablet by mouth 3 times daily for 1 day Take at 1300, 1400 and midnight the day        atorvastatin (LIPITOR) 40 MG tablet Take 40 mg by mouth nightly.          hydrochlorothiazide (HYDRODIURIL) 25 MG tablet Take 25 mg by mouth daily.          gabapentin (NEURONTIN) 800 MG tablet    Take 800 mg by mouth 3 times daily           No current facility-administered medications for this visit.                  Allergies   Allergen Reactions    Benzonatate Hives    Procaine Hcl Hives    Roxicet [Oxycodone-Acetaminophen] Hives    Dye [Barium-Containing Compounds]           The patient has a family history that is negative for severe cardiovascular or respiratory issues, negative for reaction to anesthesia.     Social History               Socioeconomic History    Marital status:        Spouse name: Not on file    Number of children: Not on file    Years of education: Not on file    Highest education level: Not on file   Occupational History    Not on file   Social Needs    Financial resource strain: Not on file    Food insecurity:       Worry: Not on file       Inability: Not on file    Transportation needs:       Medical: Not on file       Non-medical: Not on file   Tobacco Use    Smoking status: Current Every Day Smoker       Packs/day: 0.50       Years: 47.00       Pack years: 23.50       Types: Cigarettes, Cigars    Smokeless tobacco: Never Used    Tobacco comment: 1/2 pack daily/2 cigars daily   Substance and Sexual Activity    Alcohol use: No    Drug use: No    Sexual activity: Not on file   Lifestyle    Physical activity:       Days per week: Not on file       Minutes per session: Not on file    Stress: Not on file   Relationships    Social connections:       Talks on phone: Not on file       Gets together: Not on file       Attends Mosque service: Not on file       Active member of club or organization: Not on file       Attends meetings of clubs or organizations: Not on file       Relationship status: Not on file    Intimate partner violence:       Fear of current or ex partner: Not on file       Emotionally abused: Not on file       Physically abused: Not on file       Forced sexual activity: Not on file   Other Topics Concern    Not on file   Social History Narrative    Not on file                  Review of Systems  Review of Systems -  General ROS: negative for - chills, fatigue or malaise  ENT ROS: negative for - hearing change, nasal congestion or nasal discharge  Allergy and Immunology ROS: negative for - hives, itchy/watery eyes or nasal congestion  Hematological and Lymphatic ROS: negative for - blood clots, blood transfusions, bruising or fatigue  Endocrine ROS: negative for - malaise/lethargy, mood swings, palpitations or polydipsia/polyuria  Respiratory ROS: negative for - sputum changes, stridor, tachypnea or wheezing  Cardiovascular ROS: negative for - irregular heartbeat, loss of consciousness, murmur or orthopnea  Gastrointestinal ROS: negative for - constipation, diarrhea, gas/bloating, heartburn or hematemesis  Genito-Urinary ROS: negative for -  genital discharge, genital ulcers or hematuria  Musculoskeletal ROS: negative for - gait disturbance, muscle pain or muscular weakness     Physical exam:   There were no vitals taken for this visit. General appearance:  NAD  Pyscho/social: negative for tremors and hallucinations  Head: NCAT, PERRLA, EOMI, red conjunctiva  Neck: supple, no masses  Lungs: CTAB, equal chest rise bilateral  Heart: Reg rate  Abdomen: soft, nontender, nondistended  Skin; no lesions  Gu: no cva tenderness  Extremities: extremities normal, atraumatic, no cyanosis or edema        Assessment:  75 y.o. male with colon cancer and head and neck cancer     Plan:   Will plan for lap resection possible open possible ostomy  Discussed the risk, benefits and alternatives of surgery including wound infections, bleeding, scar and hernia formation and the risks of general anesthetic including MI, CVA, sudden death or reactions to anesthetic medications. The patient understands the risks and alternatives and the possibility of converting to an open procedure.  All questions were answered to the patient's satisfaction and they freely signed the consent.        Shelley Zazueta MD

## 2019-05-07 NOTE — ANESTHESIA POSTPROCEDURE EVALUATION
Department of Anesthesiology  Postprocedure Note    Patient: Travis Richardson MRN: [id number]  YOB: 1955  Date of evaluation: 5/7/2019  Time:  11:35 AM     Procedure Summary     Date:  05/07/19 Room / Location:  [address] [place] [address] 03 / [address] [address] [place] [address]    Anesthesia Start:  9750 Anesthesia Stop:  1110    Procedure:  LAPAROSCOPIC RIGHT HEMICOLECTOMY (Right ) Diagnosis:  (COLON CANCER)    Surgeon:  Javan Caraballo MD Responsible Provider:  Eliseo Quispe MD    Anesthesia Type:  general ASA Status:  3          Anesthesia Type: general    Sabra Phase I: Sabra Score: 6    Sabra Phase II:      Last vitals: Reviewed and per EMR flowsheets.        Anesthesia Post Evaluation    Patient location during evaluation: PACU  Patient participation: complete - patient participated  Level of consciousness: awake  Pain score: 0  Airway patency: patent  Nausea & Vomiting: no nausea  Complications: no  Cardiovascular status: blood pressure returned to baseline  Respiratory status: acceptable  Hydration status: euvolemic

## 2019-05-07 NOTE — PLAN OF CARE
Problem: Falls - Risk of:  Goal: Will remain free from falls  Description  Will remain free from falls  Outcome: Completed     Problem: Safety:  Goal: Free from accidental physical injury  Description  Free from accidental physical injury  Outcome: Met This Shift     Problem: Pain:  Goal: Patient's pain/discomfort is manageable  Description  Patient's pain/discomfort is manageable  Outcome: Met This Shift

## 2019-05-07 NOTE — ANESTHESIA PRE PROCEDURE
Department of Anesthesiology  Preprocedure Note       Name:  Marshall Leija. Age:  75 y.o.  :  1955                                          MRN:  [id number]         Date:  2019      Surgeon: Kev Gastelum):  Marito Wood MD    Procedure: RIGHT HEMICOLECTOMY POSS OPEN LAPAROSCOPIC (Right )    Medications prior to admission:   Prior to Admission medications    Medication Sig Start Date End Date Taking? Authorizing Provider   OXYGEN Inhale into the lungs Use as needed-  Mostly at night if needed, 31/2 liters    Historical Provider, MD   traMADol (ULTRAM) 50 MG tablet Take 1 tablet by mouth every 8 hours as needed for Pain for up to 30 days. Take lowest dose possible to manage pain 19  Janki Kahn MD   cyclobenzaprine (FLEXERIL) 10 MG tablet Take 10 mg by mouth 3 times daily as needed for Muscle spasms    Historical Provider, MD   canagliflozin (INVOKANA) 100 MG TABS tablet Take 100 mg by mouth every morning (before breakfast)    Historical Provider, MD   vitamin D (ERGOCALCIFEROL) 77584 units CAPS capsule TK 1 C PO Q WK 1/3/19   Historical Provider, MD   Roflumilast (DALIRESP) 500 MCG tablet Take 500 mcg by mouth daily    Historical Provider, MD   HYDROcodone-acetaminophen (NORCO)  MG per tablet Take 1 tablet by mouth every 6 hours as needed for Pain. Eda Xavier Historical Provider, MD   losartan (COZAAR) 50 MG tablet Take 50 mg by mouth daily    Historical Provider, MD   pantoprazole (PROTONIX) 40 MG tablet Take 40 mg by mouth every morning     Historical Provider, MD   traMADol (ULTRAM) 50 MG tablet Take 50 mg by mouth every 6 hours as needed for Pain. Eda Xavier     Historical Provider, MD   Icosapent Ethyl (VASCEPA) 1 g CAPS capsule Take 2 capsules by mouth 2 times daily    Historical Provider, MD   ranitidine (ZANTAC) 300 MG capsule Take 300 mg by mouth every evening     Historical Provider, MD   diphenhydrAMINE (BENADRYL) 25 MG tablet Take 25 mg by mouth every 6 hours as needed for Itching Historical Provider, MD   dapagliflozin (FARXIGA) 10 MG tablet Take 10 mg by mouth every morning Indications: ran out ot 2/2019     Historical Provider, MD   acarbose (PRECOSE) 100 MG tablet Take 100 mg by mouth 3 times daily (with meals) Indications: ran out of Feb 2019     Historical Provider, MD   metFORMIN (GLUCOPHAGE) 1000 MG tablet Take 1 tablet by mouth 2 times daily Indications: ran out of feb 2019 7/20/16   Historical Provider, MD   glimepiride (AMARYL) 4 MG tablet Take 1 tablet by mouth daily Indications: ran out of feb 2019 7/20/16   Historical Provider, MD   glipiZIDE (GLUCOTROL) 10 MG tablet Take 10 mg by mouth 2 times daily. Historical Provider, MD   amLODIPine (NORVASC) 10 MG tablet Take 10 mg by mouth daily. Historical Provider, MD   aspirin 81 MG EC tablet Take 81 mg by mouth daily Last dose early Feb 2019    Historical Provider, MD   atorvastatin (LIPITOR) 40 MG tablet Take 40 mg by mouth nightly. Historical Provider, MD   gabapentin (NEURONTIN) 800 MG tablet   Take 800 mg by mouth 3 times daily     Historical Provider, MD       Current medications:    Current Facility-Administered Medications   Medication Dose Route Frequency Provider Last Rate Last Dose    lactated ringers infusion   Intravenous Continuous Griffin Boyce MD        sodium chloride flush 0.9 % injection 10 mL  10 mL Intravenous 2 times per day Griffin Boyce MD        sodium chloride flush 0.9 % injection 10 mL  10 mL Intravenous PRN Griffin Boyce MD        ciprofloxacin (CIPRO) IVPB 400 mg  400 mg Intravenous On Call to [address] [place] [address], MD        metronidazole (FLAGYL) 500 mg in NaCl 100 mL IVPB premix  500 mg Intravenous On Call to [address] [place] [address] MD Ale           Allergies:     Allergies   Allergen Reactions    Benzonatate Hives    Procaine Hcl Hives    Roxicet [Oxycodone-Acetaminophen] Hives    Dye [Barium-Containing Compounds]      CT dye causes hives and itching       Problem List: Patient Active Problem List   Diagnosis Code    Brachial neuritis or radiculitis M54.12    Chronic back pain M54.9, G89.29    Scar of nose L90.5    Smoking addiction F17.200    Spondylolisthesis of lumbar region M43.16       Past Medical History:        Diagnosis Date    Bilateral lower extremity edema     Cancer ([facility] [address] 01/2019    bowel, throat    Chronic back pain     COPD (chronic obstructive pulmonary disease) (HCC)     Depression     Diabetes mellitus (HCC)     GERD (gastroesophageal reflux disease)     Headache(784.0)     Hyperlipidemia     Hypertension     Migraines     Neck pain, chronic     Numbness and tingling     neck & back    Osteoarthritis     hands fingers    Type II or unspecified type diabetes mellitus without mention of complication, not stated as uncontrolled     Unspecified sleep apnea     CPAP at times    Vitamin D deficiency        Past Surgical History:        Procedure Laterality Date    BACK SURGERY  37 yrs ago    discectomy (5 discs) at [facility] by Dr. Janice Don OTHER SURGICAL HISTORY  2/15/12    vocal cord  surgery  by Dr. Lynn Mclean at [address] N/A 1/29/2014    Scar revision with flap reapir of left nasal tip    RHINOPLASTY      ROTATOR CUFF REPAIR      left rotator cuff at [facility] by Dr. Chidi Lees History:    Social History     Tobacco Use    Smoking status: Current Every Day Smoker     Packs/day: 0.50     Years: 47.00     Pack years: 23.50     Types: Cigarettes, Cigars    Smokeless tobacco: Never Used    Tobacco comment: 1/2 pack daily/2 cigars daily   Substance Use Topics    Alcohol use: No                                Ready to quit: Not Answered  Counseling given: Not Answered  Comment: 1/2 pack daily/2 cigars daily      Vital Signs (Current): There were no vitals filed for this visit.

## 2019-05-07 NOTE — CONSULTS
reapir of left nasal tip    RHINOPLASTY      ROTATOR CUFF REPAIR      left rotator cuff at [facility] by Dr. Milda Epley Hx:  Family History   Problem Relation Age of Onset    Other Mother         liver problems    Coronary Art Dis Mother     Cancer Mother     Other Father         heart problems    Coronary Art Dis Father     Heart Attack Father     Diabetes Maternal Grandmother        HOME MEDICATIONS:  Prior to Admission medications    Medication Sig Start Date End Date Taking? Authorizing Provider   cyclobenzaprine (FLEXERIL) 10 MG tablet Take 10 mg by mouth 3 times daily as needed for Muscle spasms   Yes Historical Provider, MD   canagliflozin (INVOKANA) 100 MG TABS tablet Take 100 mg by mouth every morning (before breakfast)   Yes Historical Provider, MD   vitamin D (ERGOCALCIFEROL) 93692 units CAPS capsule TK 1 C PO Q WK 1/3/19  Yes Historical Provider, MD   Roflumilast (DALIRESP) 500 MCG tablet Take 500 mcg by mouth daily   Yes Historical Provider, MD   HYDROcodone-acetaminophen (NORCO)  MG per tablet Take 1 tablet by mouth every 6 hours as needed for Pain. .   Yes Historical Provider, MD   losartan (COZAAR) 50 MG tablet Take 50 mg by mouth daily   Yes Historical Provider, MD   pantoprazole (PROTONIX) 40 MG tablet Take 40 mg by mouth every morning    Yes Historical Provider, MD   traMADol (ULTRAM) 50 MG tablet Take 50 mg by mouth every 6 hours as needed for Pain. .   Yes Historical Provider, MD   Icosapent Ethyl (VASCEPA) 1 g CAPS capsule Take 2 capsules by mouth 2 times daily   Yes Historical Provider, MD   ranitidine (ZANTAC) 300 MG capsule Take 300 mg by mouth every evening    Yes Historical Provider, MD   diphenhydrAMINE (BENADRYL) 25 MG tablet Take 25 mg by mouth every 6 hours as needed for Itching   Yes Historical Provider, MD   glipiZIDE (GLUCOTROL) 10 MG tablet Take 10 mg by mouth 2 times daily.      Yes Historical Provider, MD   amLODIPine (NORVASC) 10 MG tablet Take 10 mg by mouth daily. Yes Historical Provider, MD   atorvastatin (LIPITOR) 40 MG tablet Take 40 mg by mouth nightly. Yes Historical Provider, MD   gabapentin (NEURONTIN) 800 MG tablet   Take 800 mg by mouth 3 times daily    Yes Historical Provider, MD   OXYGEN Inhale into the lungs Use as needed-  Mostly at night if needed, 31/2 liters    Historical Provider, MD   traMADol (ULTRAM) 50 MG tablet Take 1 tablet by mouth every 8 hours as needed for Pain for up to 30 days.  Take lowest dose possible to manage pain 4/9/19 5/9/19  Leo Gandara MD   dapagliflozin (FARXIGA) 10 MG tablet Take 10 mg by mouth every morning Indications: ran out ot 2/2019     Historical Provider, MD   acarbose (PRECOSE) 100 MG tablet Take 100 mg by mouth 3 times daily (with meals) Indications: ran out of Feb 2019     Historical Provider, MD   metFORMIN (GLUCOPHAGE) 1000 MG tablet Take 1 tablet by mouth 2 times daily Indications: ran out of feb 2019 7/20/16   Historical Provider, MD   glimepiride (AMARYL) 4 MG tablet Take 1 tablet by mouth daily Indications: ran out of feb 2019 7/20/16   Historical Provider, MD   aspirin 81 MG EC tablet Take 81 mg by mouth daily Last dose early Feb 2019    Historical Provider, MD       ALLERGIES:  Benzonatate; Procaine hcl; Roxicet [oxycodone-acetaminophen]; and Dye [barium-containing compounds]    SOCIAL Hx:  Social History     Socioeconomic History    Marital status:      Spouse name: Not on file    Number of children: Not on file    Years of education: Not on file    Highest education level: Not on file   Occupational History    Not on file   Social Needs    Financial resource strain: Not on file    Food insecurity:     Worry: Not on file     Inability: Not on file    Transportation needs:     Medical: Not on file     Non-medical: Not on file   Tobacco Use    Smoking status: Current Every Day Smoker     Packs/day: 0.50     Years: 47.00     Pack years: CONSTITUTIONAL:    Awake, alert, cooperative, no apparent distress, and appears stated age    EYES:    PERRL, EOMI, sclera clear, conjunctiva normal    ENT:    Normocephalic, atraumatic, sinuses nontender on palpation. External ears without lesions. Oral pharynx with moist mucus membranes. Tonsils without erythema or exudates. Nasal cannula oxygen is in place. NECK:    Supple, symmetrical, trachea midline, no adenopathy, thyroid symmetric, not enlarged and no tenderness, skin normal, no bruits, no JVD    HEMATOLOGIC/LYMPHATICS:    No cervical lymphadenopathy and no supraclavicular lymphadenopathy    LUNGS:    Symmetric. No increased work of breathing, good air exchange, clear to auscultation bilaterally, no wheezes, rhonchi, or rales,     CARDIOVASCULAR:    Normal apical impulse, regular rate and rhythm, normal S1 and S2, no S3 or S4, and no murmur noted    ABDOMEN:    Abdomen is soft. Bowel sounds are hypoactive. Incision sites are clean, dry, and intact. MUSCULOSKELETAL:    There is no redness, warmth, or swelling of the joints. Full range of motion noted. Motor strength is 5 out of 5 all extremities bilaterally. Tone is normal.    NEUROLOGIC:    Awake, alert, oriented to name, place and time. Cranial nerves II-XII are grossly intact. Motor is 5 out of 5 bilaterally. SKIN:    No bruising or bleeding. No redness, warmth, or swelling    EXTREMITIES:    Peripheral pulses present. No edema, cyanosis, or swelling. OSTEOPATHIC:    Examined in seated and supine positions. Normal thoracic kyphosis and lumbar lordosis. No acute somatic dysfunction.     LABORATORY DATA:  CBC with Differential:    Lab Results   Component Value Date    WBC 9.4 05/03/2019    RBC 5.43 05/03/2019    HGB 16.2 05/03/2019    HCT 47.3 05/03/2019     05/03/2019    MCV 87.1 05/03/2019    MCH 29.8 05/03/2019    MCHC 34.2 05/03/2019    RDW 14.2 05/03/2019     BMP:    Lab Results   Component Value Date     05/03/2019    K 4.3 05/03/2019     05/03/2019    CO2 25 05/03/2019    BUN 18 05/03/2019    CREATININE 0.9 05/03/2019    CALCIUM 9.4 05/03/2019    GFRAA >60 05/03/2019    LABGLOM >60 05/03/2019    GLUCOSE 270 05/03/2019    GLUCOSE 204 06/24/2011       ASSESSMENT:  1. Status post colon resection in the setting of recently diagnosed colon cancer  2. History of throat cancer being followed by the ENT team as an outpatient with plans for radiation  3. Oxygen-dependent COPD with chronic respiratory failure  4. Non-insulin-dependent diabetes mellitus type II  5. Essential hypertension  6. Hyperlipidemia    PLAN:  Home medications will be resumed as diet is advanced at the discretion of the surgical team. IV fluid resuscitation is being undertaken. Nebulized respiratory medications will be added. His pain is well-controlled at the current time.     Margarita Mai D.O., FACOI  1:17 PM  5/7/2019    Electronically signed by Margarita Mai DO on 5/7/19 at 1:17 PM

## 2019-05-07 NOTE — PLAN OF CARE
Problem: Falls - Risk of:  Goal: Absence of physical injury  Description  Absence of physical injury  Outcome: Met This Shift     Problem: Safety:  Goal: Free from accidental physical injury  Description  Free from accidental physical injury  5/7/2019 1732 by Jr Khanna RN  Outcome: Met This Shift     Problem: Pain:  Goal: Patient's pain/discomfort is manageable  Description  Patient's pain/discomfort is manageable  5/7/2019 1732 by Jr Khanna RN  Outcome: Met This Shift     Problem: Pain:  Goal: Control of acute pain  Description  Control of acute pain  Outcome: Met This Shift

## 2019-05-08 LAB
ANION GAP SERPL CALCULATED.3IONS-SCNC: 11 MMOL/L (ref 7–16)
BUN BLDV-MCNC: 19 MG/DL (ref 8–23)
CALCIUM SERPL-MCNC: 8.9 MG/DL (ref 8.6–10.2)
CHLORIDE BLD-SCNC: 99 MMOL/L (ref 98–107)
CO2: 25 MMOL/L (ref 22–29)
CREAT SERPL-MCNC: 0.8 MG/DL (ref 0.7–1.2)
GFR AFRICAN AMERICAN: >60
GFR NON-AFRICAN AMERICAN: >60 ML/MIN/1.73
GLUCOSE BLD-MCNC: 324 MG/DL (ref 74–99)
HCT VFR BLD CALC: 41.6 % (ref 37–54)
HEMOGLOBIN: 14.2 G/DL (ref 12.5–16.5)
MCH RBC QN AUTO: 30 PG (ref 26–35)
MCHC RBC AUTO-ENTMCNC: 34.1 % (ref 32–34.5)
MCV RBC AUTO: 87.9 FL (ref 80–99.9)
METER GLUCOSE: 254 MG/DL (ref 74–99)
METER GLUCOSE: 282 MG/DL (ref 74–99)
METER GLUCOSE: 293 MG/DL (ref 74–99)
METER GLUCOSE: 457 MG/DL (ref 74–99)
PDW BLD-RTO: 14.1 FL (ref 11.5–15)
PLATELET # BLD: 243 E9/L (ref 130–450)
PMV BLD AUTO: 10.1 FL (ref 7–12)
POTASSIUM REFLEX MAGNESIUM: 5.1 MMOL/L (ref 3.5–5)
RBC # BLD: 4.73 E12/L (ref 3.8–5.8)
SODIUM BLD-SCNC: 135 MMOL/L (ref 132–146)
WBC # BLD: 19.9 E9/L (ref 4.5–11.5)

## 2019-05-08 PROCEDURE — 94150 VITAL CAPACITY TEST: CPT

## 2019-05-08 PROCEDURE — 94640 AIRWAY INHALATION TREATMENT: CPT

## 2019-05-08 PROCEDURE — 6360000002 HC RX W HCPCS: Performed by: SURGERY

## 2019-05-08 PROCEDURE — 80048 BASIC METABOLIC PNL TOTAL CA: CPT

## 2019-05-08 PROCEDURE — 6370000000 HC RX 637 (ALT 250 FOR IP): Performed by: INTERNAL MEDICINE

## 2019-05-08 PROCEDURE — 36415 COLL VENOUS BLD VENIPUNCTURE: CPT

## 2019-05-08 PROCEDURE — 85027 COMPLETE CBC AUTOMATED: CPT

## 2019-05-08 PROCEDURE — 2580000003 HC RX 258: Performed by: SURGERY

## 2019-05-08 PROCEDURE — 2500000003 HC RX 250 WO HCPCS: Performed by: SURGERY

## 2019-05-08 PROCEDURE — 82962 GLUCOSE BLOOD TEST: CPT

## 2019-05-08 PROCEDURE — 1200000000 HC SEMI PRIVATE

## 2019-05-08 PROCEDURE — 99024 POSTOP FOLLOW-UP VISIT: CPT | Performed by: SURGERY

## 2019-05-08 PROCEDURE — 6370000000 HC RX 637 (ALT 250 FOR IP): Performed by: SURGERY

## 2019-05-08 RX ORDER — GLIMEPIRIDE 4 MG/1
4 TABLET ORAL
Status: DISCONTINUED | OUTPATIENT
Start: 2019-05-09 | End: 2019-05-08

## 2019-05-08 RX ORDER — CALCIUM CARBONATE 200(500)MG
500 TABLET,CHEWABLE ORAL 3 TIMES DAILY PRN
Status: DISCONTINUED | OUTPATIENT
Start: 2019-05-08 | End: 2019-05-09 | Stop reason: HOSPADM

## 2019-05-08 RX ORDER — GLIPIZIDE 5 MG/1
10 TABLET ORAL
Status: DISCONTINUED | OUTPATIENT
Start: 2019-05-08 | End: 2019-05-09 | Stop reason: HOSPADM

## 2019-05-08 RX ADMIN — OMEGA-3-ACID ETHYL ESTERS 2 G: 1 CAPSULE, LIQUID FILLED ORAL at 21:44

## 2019-05-08 RX ADMIN — GABAPENTIN 800 MG: 400 CAPSULE ORAL at 13:29

## 2019-05-08 RX ADMIN — PANTOPRAZOLE SODIUM 40 MG: 40 TABLET, DELAYED RELEASE ORAL at 06:39

## 2019-05-08 RX ADMIN — INSULIN LISPRO 2 UNITS: 100 INJECTION, SOLUTION INTRAVENOUS; SUBCUTANEOUS at 21:47

## 2019-05-08 RX ADMIN — METFORMIN HYDROCHLORIDE 1000 MG: 1000 TABLET ORAL at 21:44

## 2019-05-08 RX ADMIN — INSULIN LISPRO 6 UNITS: 100 INJECTION, SOLUTION INTRAVENOUS; SUBCUTANEOUS at 12:05

## 2019-05-08 RX ADMIN — LOSARTAN POTASSIUM 50 MG: 50 TABLET ORAL at 09:31

## 2019-05-08 RX ADMIN — ATORVASTATIN CALCIUM 40 MG: 40 TABLET, FILM COATED ORAL at 21:44

## 2019-05-08 RX ADMIN — ROFLUMILAST 500 MCG: 500 TABLET ORAL at 12:05

## 2019-05-08 RX ADMIN — IPRATROPIUM BROMIDE AND ALBUTEROL SULFATE 1 AMPULE: .5; 3 SOLUTION RESPIRATORY (INHALATION) at 06:19

## 2019-05-08 RX ADMIN — HYDROCODONE BITARTRATE AND ACETAMINOPHEN 2 TABLET: 5; 325 TABLET ORAL at 21:50

## 2019-05-08 RX ADMIN — GLIPIZIDE 10 MG: 5 TABLET ORAL at 08:55

## 2019-05-08 RX ADMIN — ENOXAPARIN SODIUM 40 MG: 40 INJECTION SUBCUTANEOUS at 08:55

## 2019-05-08 RX ADMIN — HYDROCODONE BITARTRATE AND ACETAMINOPHEN 2 TABLET: 5; 325 TABLET ORAL at 01:26

## 2019-05-08 RX ADMIN — HYDROCODONE BITARTRATE AND ACETAMINOPHEN 2 TABLET: 5; 325 TABLET ORAL at 09:03

## 2019-05-08 RX ADMIN — INSULIN LISPRO 3 UNITS: 100 INJECTION, SOLUTION INTRAVENOUS; SUBCUTANEOUS at 16:05

## 2019-05-08 RX ADMIN — IPRATROPIUM BROMIDE AND ALBUTEROL SULFATE 1 AMPULE: .5; 3 SOLUTION RESPIRATORY (INHALATION) at 18:13

## 2019-05-08 RX ADMIN — METRONIDAZOLE 500 MG: 500 INJECTION, SOLUTION INTRAVENOUS at 01:41

## 2019-05-08 RX ADMIN — GABAPENTIN 800 MG: 400 CAPSULE ORAL at 08:55

## 2019-05-08 RX ADMIN — GABAPENTIN 800 MG: 400 CAPSULE ORAL at 21:44

## 2019-05-08 RX ADMIN — IPRATROPIUM BROMIDE AND ALBUTEROL SULFATE 1 AMPULE: .5; 3 SOLUTION RESPIRATORY (INHALATION) at 14:01

## 2019-05-08 RX ADMIN — OMEGA-3-ACID ETHYL ESTERS 2 G: 1 CAPSULE, LIQUID FILLED ORAL at 12:05

## 2019-05-08 RX ADMIN — INSULIN LISPRO 4 UNITS: 100 INJECTION, SOLUTION INTRAVENOUS; SUBCUTANEOUS at 08:56

## 2019-05-08 RX ADMIN — CALCIUM CARBONATE (ANTACID) CHEW TAB 500 MG 500 MG: 500 CHEW TAB at 12:05

## 2019-05-08 RX ADMIN — GLIPIZIDE 10 MG: 5 TABLET ORAL at 16:04

## 2019-05-08 RX ADMIN — METFORMIN HYDROCHLORIDE 1000 MG: 1000 TABLET ORAL at 08:55

## 2019-05-08 RX ADMIN — AMLODIPINE BESYLATE 10 MG: 10 TABLET ORAL at 08:55

## 2019-05-08 RX ADMIN — IPRATROPIUM BROMIDE AND ALBUTEROL SULFATE 1 AMPULE: .5; 3 SOLUTION RESPIRATORY (INHALATION) at 09:28

## 2019-05-08 RX ADMIN — Medication 10 ML: at 22:57

## 2019-05-08 RX ADMIN — ASPIRIN 81 MG: 81 TABLET, COATED ORAL at 08:55

## 2019-05-08 RX ADMIN — HYDROCODONE BITARTRATE AND ACETAMINOPHEN 2 TABLET: 5; 325 TABLET ORAL at 15:32

## 2019-05-08 RX ADMIN — Medication 10 ML: at 08:55

## 2019-05-08 ASSESSMENT — PAIN DESCRIPTION - PAIN TYPE
TYPE: SURGICAL PAIN

## 2019-05-08 ASSESSMENT — PAIN SCALES - GENERAL
PAINLEVEL_OUTOF10: 5
PAINLEVEL_OUTOF10: 8
PAINLEVEL_OUTOF10: 7
PAINLEVEL_OUTOF10: 0
PAINLEVEL_OUTOF10: 7
PAINLEVEL_OUTOF10: 8
PAINLEVEL_OUTOF10: 7

## 2019-05-08 ASSESSMENT — PAIN DESCRIPTION - ONSET
ONSET: ON-GOING
ONSET: ON-GOING

## 2019-05-08 ASSESSMENT — PAIN - FUNCTIONAL ASSESSMENT: PAIN_FUNCTIONAL_ASSESSMENT: ACTIVITIES ARE NOT PREVENTED

## 2019-05-08 ASSESSMENT — PAIN DESCRIPTION - FREQUENCY
FREQUENCY: CONTINUOUS
FREQUENCY: CONTINUOUS

## 2019-05-08 ASSESSMENT — PAIN DESCRIPTION - ORIENTATION
ORIENTATION: RIGHT;LEFT;MID
ORIENTATION: LOWER

## 2019-05-08 ASSESSMENT — PAIN DESCRIPTION - DESCRIPTORS
DESCRIPTORS: ACHING;DISCOMFORT;DULL
DESCRIPTORS: ACHING;TENDER;SORE
DESCRIPTORS: ACHING;CONSTANT;DISCOMFORT
DESCRIPTORS: SORE

## 2019-05-08 ASSESSMENT — PAIN DESCRIPTION - PROGRESSION
CLINICAL_PROGRESSION: NOT CHANGED
CLINICAL_PROGRESSION: GRADUALLY IMPROVING
CLINICAL_PROGRESSION: NOT CHANGED

## 2019-05-08 ASSESSMENT — PAIN DESCRIPTION - LOCATION
LOCATION: ABDOMEN

## 2019-05-08 NOTE — PROGRESS NOTES
HPI:  Tony Vanessa tolerated surgical intervention yesterday without complication and is doing extremely well today. Ramesh catheter was removed and he is voiding without difficulty. He is tolerating his current diet. He has minimal postoperative abdominal pain. He is passing flatus. No family members were present during my examination. Patient voiced no new complaints since hospital admission. Questions, answers, and tests reviewed. ROS:  Cardiovascular:   Denies any chest pain, irregular heartbeats, or palpitations. Respiratory:   Denies shortness of breath, coughing, sputum production, hemoptysis, or wheezing. Gastrointestinal:   Very minimal postoperative abdominal pain. He is passing flatus. Extremities:   Denies any lower extremity swelling or edema. Neurology:    Denies any headache or focal neurological deficits. No weakness or paresthesia. Derm:    Denies any rashes, ulcers, or excoriations. Denies bruising. Genitourinary:    Denies any urgency, frequency, hematuria. Voiding without difficulty. Physical Exam:    Vitals:    05/08/19 0000   BP:    Pulse:    Resp:    Temp:    SpO2: 92%       HEENT:  PERRLA. EOMI. Sclera clear. Buccal mucosa moist.    Neck:  Supple. Trachea midline. No thyromegaly. No JVD. No bruits. Heart:  Rhythm regular, rate controlled. No murmurs. Lungs:  Symmetrical. Clear to auscultation bilaterally. No wheezes. No rhonchi. No rales. Abdomen: Soft. Non-tender. Non-distended. Bowel sounds positive. No organomegaly or masses. No pain on palpation    Extremities:  Peripheral pulses present. No peripheral edema. No ulcers. Neurologic:  Alert x 3. No focal deficit. Cranial nerves grossly intact. Skin:  No petechia. No hemorrhage. No wounds.     CBC with Differential:    Lab Results   Component Value Date    WBC 19.9 05/08/2019    RBC 4.73 05/08/2019    HGB 14.2 05/08/2019    HCT 41.6 05/08/2019     05/08/2019    MCV 87.9 05/08/2019    MCH 30.0 05/08/2019    MCHC 34.1 05/08/2019    RDW 14.1 05/08/2019     BMP:    Lab Results   Component Value Date     05/08/2019    K 5.1 05/08/2019    CL 99 05/08/2019    CO2 25 05/08/2019    BUN 19 05/08/2019    CREATININE 0.8 05/08/2019    CALCIUM 8.9 05/08/2019    GFRAA >60 05/08/2019    LABGLOM >60 05/08/2019    GLUCOSE 324 05/08/2019    GLUCOSE 204 06/24/2011       Other Data:      Intake/Output Summary (Last 24 hours) at 5/8/2019 0815  Last data filed at 5/8/2019 0630  Gross per 24 hour   Intake 1270 ml   Output 4175 ml   Net -2905 ml         Scheduled Medications:   glipiZIDE  10 mg Oral BID AC    sodium chloride flush  10 mL Intravenous 2 times per day    enoxaparin  40 mg Subcutaneous Daily    amLODIPine  10 mg Oral Daily    aspirin  81 mg Oral Daily    atorvastatin  40 mg Oral Nightly    gabapentin  800 mg Oral TID    losartan  50 mg Oral Daily    metFORMIN  1,000 mg Oral BID    Roflumilast  500 mcg Oral Daily    insulin lispro  0-6 Units Subcutaneous TID WC    insulin lispro  0-3 Units Subcutaneous Nightly    pantoprazole  40 mg Oral QAM AC    ipratropium-albuterol  1 ampule Inhalation Q4H WA    omega-3 acid ethyl esters  2 g Oral BID         Infusion Medications:   dextrose         Assessment:   1. Status post colon resection in the setting of recently diagnosed colon cancer  2. History of throat cancer being followed by the ENT team as an outpatient with plans for radiation  3. Oxygen-dependent COPD with chronic respiratory failure  4. Non-insulin-dependent diabetes mellitus type II  5. Essential hypertension  6. Hyperlipidemia    Plan:   The patient is doing very well postoperatively. Diet will be advanced at the discretion of the surgical team. Blood sugars have been elevated and we will slowly add back oral diabetic medications as the diet is being advanced. Otherwise, we will utilize correction insulin. The patient tolerated removal of the catheter. He will continue with ambulation. Continue current therapy. See orders for further plan of care.     Aakash Melton D.O.  8:15 AM  5/8/2019

## 2019-05-08 NOTE — PLAN OF CARE
Problem: Falls - Risk of:  Goal: Absence of physical injury  Description  Absence of physical injury  5/8/2019 0253 by Misael Antonio RN  Outcome: Met This Shift     Problem: Pain:  Goal: Control of acute pain  Description  Control of acute pain  5/8/2019 0253 by Misael Antonio RN  Outcome: Not Met This Shift  Note:   Pain level < 5 on 0-10 pain scale during my shift

## 2019-05-08 NOTE — PROGRESS NOTES
Surgery Progress Note            Chief complaint:   Patient Active Problem List   Diagnosis    Brachial neuritis or radiculitis    Chronic back pain    Scar of nose    Smoking addiction    Spondylolisthesis of lumbar region    S/P colon resection       S: doing well passing flatus, tolerating liquids    O:   Vitals:    05/08/19 0000   BP:    Pulse:    Resp:    Temp:    SpO2: 92%       Intake/Output Summary (Last 24 hours) at 5/8/2019 0903  Last data filed at 5/8/2019 0849  Gross per 24 hour   Intake 1510 ml   Output 4175 ml   Net -2665 ml           Labs:  Recent Labs     05/08/19  0459   WBC 19.9*   HGB 14.2   HCT 41.6     Lab Results   Component Value Date    CREATININE 0.8 05/08/2019    BUN 19 05/08/2019     05/08/2019    K 5.1 (H) 05/08/2019    CL 99 05/08/2019    CO2 25 05/08/2019     No results for input(s): LIPASE, AMYLASE in the last 72 hours. Physical exam:   BP (!) 122/57   Pulse 102   Temp 98.4 °F (36.9 °C) (Oral)   Resp 18   Ht 6' (1.829 m)   Wt 207 lb (93.9 kg)   SpO2 92%   BMI 28.07 kg/m²   General appearance: NAD  Head: NCAT  Neck: supple, no masses  Lungs: equal chest rise bilateral  Heart: S1S2 present  Abdomen: soft, minimally tender, nondistended,  Incisions clean and intact  Skin; no lesions  Gu: no cva tenderness  Extremities: extremities normal, atraumatic, no cyanosis or edema    A:  POD# 1 right hemicolectomy for precancerous polyp large and near obstructing    P: general diet, pate out, likely home tomorrow if tolerates diet.      Timo Mooney MD  5/8/2019

## 2019-05-09 VITALS
WEIGHT: 207 LBS | HEART RATE: 84 BPM | BODY MASS INDEX: 28.04 KG/M2 | SYSTOLIC BLOOD PRESSURE: 120 MMHG | HEIGHT: 72 IN | DIASTOLIC BLOOD PRESSURE: 58 MMHG | TEMPERATURE: 97.4 F | OXYGEN SATURATION: 95 % | RESPIRATION RATE: 18 BRPM

## 2019-05-09 LAB
ALBUMIN SERPL-MCNC: 3.5 G/DL (ref 3.5–5.2)
ALP BLD-CCNC: 112 U/L (ref 40–129)
ALT SERPL-CCNC: 17 U/L (ref 0–40)
ANION GAP SERPL CALCULATED.3IONS-SCNC: 10 MMOL/L (ref 7–16)
AST SERPL-CCNC: 18 U/L (ref 0–39)
BASOPHILS ABSOLUTE: 0.03 E9/L (ref 0–0.2)
BASOPHILS RELATIVE PERCENT: 0.2 % (ref 0–2)
BILIRUB SERPL-MCNC: 0.4 MG/DL (ref 0–1.2)
BUN BLDV-MCNC: 17 MG/DL (ref 8–23)
CALCIUM SERPL-MCNC: 9.4 MG/DL (ref 8.6–10.2)
CHLORIDE BLD-SCNC: 98 MMOL/L (ref 98–107)
CO2: 26 MMOL/L (ref 22–29)
CREAT SERPL-MCNC: 0.6 MG/DL (ref 0.7–1.2)
EOSINOPHILS ABSOLUTE: 0.05 E9/L (ref 0.05–0.5)
EOSINOPHILS RELATIVE PERCENT: 0.4 % (ref 0–6)
GFR AFRICAN AMERICAN: >60
GFR NON-AFRICAN AMERICAN: >60 ML/MIN/1.73
GLUCOSE BLD-MCNC: 281 MG/DL (ref 74–99)
HCT VFR BLD CALC: 42.6 % (ref 37–54)
HEMOGLOBIN: 14.2 G/DL (ref 12.5–16.5)
IMMATURE GRANULOCYTES #: 0.06 E9/L
IMMATURE GRANULOCYTES %: 0.4 % (ref 0–5)
LYMPHOCYTES ABSOLUTE: 2.52 E9/L (ref 1.5–4)
LYMPHOCYTES RELATIVE PERCENT: 17.9 % (ref 20–42)
MCH RBC QN AUTO: 29.4 PG (ref 26–35)
MCHC RBC AUTO-ENTMCNC: 33.3 % (ref 32–34.5)
MCV RBC AUTO: 88.2 FL (ref 80–99.9)
METER GLUCOSE: 173 MG/DL (ref 74–99)
METER GLUCOSE: 301 MG/DL (ref 74–99)
MONOCYTES ABSOLUTE: 0.81 E9/L (ref 0.1–0.95)
MONOCYTES RELATIVE PERCENT: 5.8 % (ref 2–12)
NEUTROPHILS ABSOLUTE: 10.59 E9/L (ref 1.8–7.3)
NEUTROPHILS RELATIVE PERCENT: 75.3 % (ref 43–80)
PDW BLD-RTO: 14.3 FL (ref 11.5–15)
PLATELET # BLD: 243 E9/L (ref 130–450)
PMV BLD AUTO: 9.8 FL (ref 7–12)
POTASSIUM SERPL-SCNC: 4.1 MMOL/L (ref 3.5–5)
RBC # BLD: 4.83 E12/L (ref 3.8–5.8)
SODIUM BLD-SCNC: 134 MMOL/L (ref 132–146)
TOTAL PROTEIN: 7 G/DL (ref 6.4–8.3)
WBC # BLD: 14.1 E9/L (ref 4.5–11.5)

## 2019-05-09 PROCEDURE — 44204 LAPARO PARTIAL COLECTOMY: CPT | Performed by: SURGERY

## 2019-05-09 PROCEDURE — 49905 OMENTAL FLAP INTRA-ABDOM: CPT | Performed by: SURGERY

## 2019-05-09 PROCEDURE — 0DTF4ZZ RESECTION OF RIGHT LARGE INTESTINE, PERCUTANEOUS ENDOSCOPIC APPROACH: ICD-10-PCS | Performed by: SURGERY

## 2019-05-09 PROCEDURE — 6370000000 HC RX 637 (ALT 250 FOR IP): Performed by: INTERNAL MEDICINE

## 2019-05-09 PROCEDURE — 80053 COMPREHEN METABOLIC PANEL: CPT

## 2019-05-09 PROCEDURE — 82962 GLUCOSE BLOOD TEST: CPT

## 2019-05-09 PROCEDURE — 85025 COMPLETE CBC W/AUTO DIFF WBC: CPT

## 2019-05-09 PROCEDURE — 99024 POSTOP FOLLOW-UP VISIT: CPT | Performed by: SURGERY

## 2019-05-09 PROCEDURE — 36415 COLL VENOUS BLD VENIPUNCTURE: CPT

## 2019-05-09 PROCEDURE — 94640 AIRWAY INHALATION TREATMENT: CPT

## 2019-05-09 PROCEDURE — 6370000000 HC RX 637 (ALT 250 FOR IP): Performed by: SURGERY

## 2019-05-09 RX ADMIN — METFORMIN HYDROCHLORIDE 1000 MG: 1000 TABLET ORAL at 09:10

## 2019-05-09 RX ADMIN — OMEGA-3-ACID ETHYL ESTERS 2 G: 1 CAPSULE, LIQUID FILLED ORAL at 11:30

## 2019-05-09 RX ADMIN — ROFLUMILAST 500 MCG: 500 TABLET ORAL at 11:30

## 2019-05-09 RX ADMIN — LOSARTAN POTASSIUM 50 MG: 50 TABLET ORAL at 09:10

## 2019-05-09 RX ADMIN — PANTOPRAZOLE SODIUM 40 MG: 40 TABLET, DELAYED RELEASE ORAL at 06:06

## 2019-05-09 RX ADMIN — INSULIN LISPRO 1 UNITS: 100 INJECTION, SOLUTION INTRAVENOUS; SUBCUTANEOUS at 09:05

## 2019-05-09 RX ADMIN — GLIPIZIDE 10 MG: 5 TABLET ORAL at 06:06

## 2019-05-09 RX ADMIN — HYDROCODONE BITARTRATE AND ACETAMINOPHEN 2 TABLET: 5; 325 TABLET ORAL at 02:37

## 2019-05-09 RX ADMIN — ASPIRIN 81 MG: 81 TABLET, COATED ORAL at 09:10

## 2019-05-09 RX ADMIN — GABAPENTIN 800 MG: 400 CAPSULE ORAL at 09:10

## 2019-05-09 RX ADMIN — INSULIN LISPRO 4 UNITS: 100 INJECTION, SOLUTION INTRAVENOUS; SUBCUTANEOUS at 11:26

## 2019-05-09 RX ADMIN — AMLODIPINE BESYLATE 10 MG: 10 TABLET ORAL at 09:18

## 2019-05-09 RX ADMIN — IPRATROPIUM BROMIDE AND ALBUTEROL SULFATE 1 AMPULE: .5; 3 SOLUTION RESPIRATORY (INHALATION) at 07:26

## 2019-05-09 RX ADMIN — HYDROCODONE BITARTRATE AND ACETAMINOPHEN 2 TABLET: 5; 325 TABLET ORAL at 09:10

## 2019-05-09 ASSESSMENT — PAIN DESCRIPTION - DESCRIPTORS
DESCRIPTORS: ACHING;DISCOMFORT;DULL
DESCRIPTORS: ACHING

## 2019-05-09 ASSESSMENT — PAIN DESCRIPTION - LOCATION
LOCATION: ABDOMEN
LOCATION: ABDOMEN

## 2019-05-09 ASSESSMENT — PAIN DESCRIPTION - ONSET: ONSET: ON-GOING

## 2019-05-09 ASSESSMENT — PAIN DESCRIPTION - FREQUENCY: FREQUENCY: CONTINUOUS

## 2019-05-09 ASSESSMENT — PAIN SCALES - GENERAL
PAINLEVEL_OUTOF10: 5
PAINLEVEL_OUTOF10: 7
PAINLEVEL_OUTOF10: 7
PAINLEVEL_OUTOF10: 4

## 2019-05-09 ASSESSMENT — PAIN DESCRIPTION - ORIENTATION: ORIENTATION: LOWER

## 2019-05-09 ASSESSMENT — PAIN DESCRIPTION - PAIN TYPE
TYPE: SURGICAL PAIN
TYPE: ACUTE PAIN;SURGICAL PAIN

## 2019-05-09 ASSESSMENT — PAIN DESCRIPTION - PROGRESSION
CLINICAL_PROGRESSION: GRADUALLY IMPROVING
CLINICAL_PROGRESSION: NOT CHANGED

## 2019-05-09 NOTE — PROGRESS NOTES
[facility] Quality Flow/Interdisciplinary Rounds Progress Note        Quality Flow Rounds held on May 9, 2019    Disciplines Attending:  Bedside Nurse, ,  and Nursing Unit [facility]. was admitted on 5/7/2019  8:27 AM    Anticipated Discharge Date:  Expected Discharge Date: 05/10/19    Disposition:    Marbin Score:  Marbin Scale Score: 23    Readmission Risk              Risk of Unplanned Readmission:        11           Discussed patient goal for the day, patient clinical progression, and barriers to discharge.   The following Goal(s) of the Day/Commitment(s) have been identified:  Probable discharge home today      Florin Faria  May 9, 2019

## 2019-05-09 NOTE — PROGRESS NOTES
pain on palpation Surgical changes of the abdomen. Incisions are dry and well approximated. Surgical glue intact. Rounded abdomen. Extremities:  Peripheral pulses present. No peripheral edema. No ulcers. Neurologic:  Alert x 3. No focal deficit. Cranial nerves grossly intact. Skin:  No petechia. No hemorrhage. Surgical incisions on the abdomen. Tattoos.      CBC with Differential:    Lab Results   Component Value Date    WBC 14.1 05/09/2019    RBC 4.83 05/09/2019    HGB 14.2 05/09/2019    HCT 42.6 05/09/2019     05/09/2019    MCV 88.2 05/09/2019    MCH 29.4 05/09/2019    MCHC 33.3 05/09/2019    RDW 14.3 05/09/2019    LYMPHOPCT 17.9 05/09/2019    MONOPCT 5.8 05/09/2019    BASOPCT 0.2 05/09/2019    MONOSABS 0.81 05/09/2019    LYMPHSABS 2.52 05/09/2019    EOSABS 0.05 05/09/2019    BASOSABS 0.03 05/09/2019     BMP:    Lab Results   Component Value Date     05/09/2019    K 4.1 05/09/2019    K 5.1 05/08/2019    CL 98 05/09/2019    CO2 26 05/09/2019    BUN 17 05/09/2019    LABALBU 3.5 05/09/2019    CREATININE 0.6 05/09/2019    CALCIUM 9.4 05/09/2019    GFRAA >60 05/09/2019    LABGLOM >60 05/09/2019    GLUCOSE 281 05/09/2019    GLUCOSE 204 06/24/2011       Other Data:      Intake/Output Summary (Last 24 hours) at 5/9/2019 1212  Last data filed at 5/9/2019 0650  Gross per 24 hour   Intake 1420 ml   Output 2250 ml   Net -830 ml         Scheduled Medications:   glipiZIDE  10 mg Oral BID AC    canagliflozin  300 mg Oral QAM AC    sodium chloride flush  10 mL Intravenous 2 times per day    enoxaparin  40 mg Subcutaneous Daily    amLODIPine  10 mg Oral Daily    aspirin  81 mg Oral Daily    atorvastatin  40 mg Oral Nightly    gabapentin  800 mg Oral TID    losartan  50 mg Oral Daily    metFORMIN  1,000 mg Oral BID    Roflumilast  500 mcg Oral Daily    insulin lispro  0-6 Units Subcutaneous TID WC    insulin lispro  0-3 Units Subcutaneous Nightly    pantoprazole  40 mg Oral QAM AC   

## 2019-05-09 NOTE — DISCHARGE SUMMARY
Physician Discharge Summary     Olga Lidia Coto  [id number]    Admit date: 5/7/2019    Discharge date and time: No discharge date for patient encounter. Admitting Physician: Jaden Welch MD     Admission Diagnoses: S/P colon resection [Z90.49]  S/P colon resection [Z90.49]    Condition at discharge: stable   Laci Abraham. Home Medication Instructions RBD:[id number]    Printed on:05/09/19 0831   Medication Information                      acarbose (PRECOSE) 100 MG tablet  Take 100 mg by mouth 3 times daily (with meals) Indications: ran out of Feb 2019              amLODIPine (NORVASC) 10 MG tablet  Take 10 mg by mouth daily. aspirin 81 MG EC tablet  Take 81 mg by mouth daily Last dose early Feb 2019             atorvastatin (LIPITOR) 40 MG tablet  Take 40 mg by mouth nightly. canagliflozin (INVOKANA) 100 MG TABS tablet  Take 100 mg by mouth every morning (before breakfast)             cyclobenzaprine (FLEXERIL) 10 MG tablet  Take 10 mg by mouth 3 times daily as needed for Muscle spasms             dapagliflozin (FARXIGA) 10 MG tablet  Take 10 mg by mouth every morning Indications: ran out ot 2/2019              diphenhydrAMINE (BENADRYL) 25 MG tablet  Take 25 mg by mouth every 6 hours as needed for Itching             gabapentin (NEURONTIN) 800 MG tablet    Take 800 mg by mouth 3 times daily              glimepiride (AMARYL) 4 MG tablet  Take 1 tablet by mouth daily Indications: ran out of feb 2019              glipiZIDE (GLUCOTROL) 10 MG tablet  Take 10 mg by mouth 2 times daily. HYDROcodone-acetaminophen (NORCO)  MG per tablet  Take 1 tablet by mouth every 6 hours as needed for Pain. [place] Ethyl (VASCEPA) 1 g CAPS capsule  Take 2 capsules by mouth 2 times daily             losartan (COZAAR) 50 MG tablet  Take 50 mg by mouth daily             metFORMIN (GLUCOPHAGE) 1000 MG tablet  Take 1 tablet by mouth 2 times daily Indications: ran out of feb 2019              OXYGEN  Inhale into the lungs Use as needed-  Mostly at night if needed, 31/2 liters             pantoprazole (PROTONIX) 40 MG tablet  Take 40 mg by mouth every morning              ranitidine (ZANTAC) 300 MG capsule  Take 300 mg by mouth every evening              Roflumilast (DALIRESP) 500 MCG tablet  Take 500 mcg by mouth daily             traMADol (ULTRAM) 50 MG tablet  Take 50 mg by mouth every 6 hours as needed for Pain. .             traMADol (ULTRAM) 50 MG tablet  Take 1 tablet by mouth every 8 hours as needed for Pain for up to 30 days. Take lowest dose possible to manage pain             vitamin D (ERGOCALCIFEROL) 47114 units CAPS capsule  TK 1 C PO Q WK                   Hospital Course: Had uncomplicated lap right hemicolectomy and uncomplicated post operative course and was discharged tolerating a general diet, having good bowel function, ambulating independently and with adequate analgesia. Discharge Exam: BP (!) 155/72   Pulse 97   Temp 97.6 °F (36.4 °C) (Oral)   Resp 18   Ht 6' (1.829 m)   Wt 207 lb (93.9 kg)   SpO2 96%   BMI 28.07 kg/m²     General appearance: alert, appears stated age and cooperative  Head: Normocephalic, without obvious abnormality, atraumatic  Neck: no adenopathy, no carotid bruit, no JVD, supple, symmetrical, trachea midline and thyroid not enlarged, symmetric, no tenderness/mass/nodules  Lungs: clear to auscultation bilaterally  Heart: regular rate and rhythm, S1, S2 normal, no murmur, click, rub or gallop  Abdomen: soft, non-tender; bowel sounds normal; no masses,  no organomegaly and incisions clean and dry  Extremities: extremities normal, atraumatic, no cyanosis or edema      Disposition: home    Patient Instructions: Activity: activity as tolerated  Diet: regular diet  Wound Care: keep wound clean and dry    Follow-up with Dr. FRANK in 2 weeks.     Signed:  Kiko Duval  5/9/2019  8:31 AM

## 2019-05-13 DIAGNOSIS — G89.18 POST-OP PAIN: Primary | ICD-10-CM

## 2019-05-13 RX ORDER — HYDROCODONE BITARTRATE AND ACETAMINOPHEN 5; 325 MG/1; MG/1
1 TABLET ORAL EVERY 6 HOURS PRN
Qty: 28 TABLET | Refills: 0 | Status: SHIPPED | OUTPATIENT
Start: 2019-05-13 | End: 2019-05-20

## 2019-05-13 NOTE — OP NOTE
DATE OF PROCEDURE: 5/7/19  SURGEON: Santa Fam M.D.  ASSISTANT: none M.D. PREOPERATIVE DIAGNOSIS: Right colon mass. POSTOPERATIVE DIAGNOSIS: same. OPERATION:   1.)Laparoscopic right hemicolectomy. 2.)mobilization and placement of omental patch  ANESTHESIA: General.  ESTIMATED BLOOD LOSS: 100 mL. COMPLICATIONS: None. FLUIDS: Crystalloid. DISPOSITION: To be admitted in for routine postoperative care. INDICATION: This is a 67-year-old male with the aforementioned diagnosis. I explained the risks, benefits, potential outcomes, and alternative  treatment to the aforementioned procedure, and she agreed to proceed  understanding those risks and potential outcomes. PROCEDURE: The patient was brought into the operative suite, was placed  under general anesthesia, had bilateral PCDs placed, was given preoperative  antibiotics within 30 minutes of incision, had a Ramesh catheter placed. Once  this was done, a 5-mm incision was made supraumbilically after local  anesthetic was infiltrated into the abdominal wall. A Veress needle was  passed into the peritoneum. CO2 was used to insufflate the abdomen to a  pressure of 15 mmHg. At this time, the Veress needle was removed and a 5-mm  trocar was put in its place. The laparoscope was introduced through this  trocar and 2 additional trocars were placed; 1 in the suprapubic, 1 in the  left lower quadrant of the abdomen. An additional trocar was placed in the  left upper quadrant of the abdomen. Once this was done, we were able to identify the ileocolic vessel and this  was exposed and skeletonized with the hook cautery. This was taken then with  the LigaSure device. We then bluntly dissected from the mediolateral  approach around the right colon, exposing up to the hepatic flexure and  taking that down as well with the LigaSure device.    Once the entire colon and terminal ileum were mobilized to the medial aspect  of the body, the 5-mm incision at the paraumbilical area was extended to  approximately 4 cm. Electrocautery was able to dissect down through the  fascia and make this aperture throughout the entire abdominal wall. Once we  completed this, a GelPoint was passed into the abdomen. The cecum was  grabbed and pulled out through the [place]. Stapling was done of the  terminal ileum approximately 10 cm from the ileocecal valve and at the mid transverse colon. We then evacuated pneumoperitoneum. There was plenty of room now to bring up  and do an anastomosis in a side-to-side fashion. This was done with the ADAL  75 stapler and closed with ADAL 75. At this time, the crotch stitch  was placed with a 2-0 silk suture and the staple line was imbricated. Omentum was mobilized with cautery and sewn over the anastomosis. The abdomen was then suction-irrigated until the aspirate returned clear. Hemostasis appeared to be adequate. We then evacuated pneumoperitoneum. The fascial defect at the paraumbilical  region was closed with interrupted, figure-of-eight, #1 PDS sutures. The  skin at that area was closed with 3-0 Vicryl sutures in interrupted fashion. The other trocar sites were closed with a 4-0 Monocryl in a subcuticular  fashion. Once this was done, Dermabond was placed on all incisions. The  patient was woken up in stable condition and taken to PACU.

## 2019-05-20 ENCOUNTER — OFFICE VISIT (OUTPATIENT)
Dept: SURGERY | Age: 64
End: 2019-05-20

## 2019-05-20 VITALS
TEMPERATURE: 99 F | BODY MASS INDEX: 27.09 KG/M2 | RESPIRATION RATE: 18 BRPM | WEIGHT: 200 LBS | HEIGHT: 72 IN | DIASTOLIC BLOOD PRESSURE: 74 MMHG | SYSTOLIC BLOOD PRESSURE: 140 MMHG | HEART RATE: 94 BPM | OXYGEN SATURATION: 96 %

## 2019-05-20 DIAGNOSIS — K63.89 COLONIC MASS: Primary | ICD-10-CM

## 2019-05-20 PROCEDURE — 99024 POSTOP FOLLOW-UP VISIT: CPT | Performed by: SURGERY

## 2019-05-20 RX ORDER — OMEPRAZOLE 40 MG/1
CAPSULE, DELAYED RELEASE ORAL
Refills: 3 | COMMUNITY
Start: 2019-05-01 | End: 2020-02-19 | Stop reason: ALTCHOICE

## 2019-05-20 RX ORDER — SULFAMETHOXAZOLE AND TRIMETHOPRIM 800; 160 MG/1; MG/1
1 TABLET ORAL 2 TIMES DAILY
Qty: 14 TABLET | Refills: 0 | Status: SHIPPED | OUTPATIENT
Start: 2019-05-20 | End: 2019-05-27

## 2019-05-20 NOTE — PROGRESS NOTES
Surgery Progress Note            Chief complaint:   Patient Active Problem List   Diagnosis    Brachial neuritis or radiculitis    Chronic back pain    Scar of nose    Smoking addiction    Spondylolisthesis of lumbar region    S/P colon resection    Colonic mass       S: doing well    O:   Vitals:    05/20/19 1332   BP: (!) 140/74   Pulse: 94   Resp: 18   Temp: 99 °F (37.2 °C)   SpO2: 96%     No intake or output data in the 24 hours ending 05/20/19 1343        Labs:  No results for input(s): WBC, HGB, HCT in the last 72 hours. Invalid input(s): PLR  Lab Results   Component Value Date    CREATININE 0.6 (L) 05/09/2019    BUN 17 05/09/2019     05/09/2019    K 4.1 05/09/2019    CL 98 05/09/2019    CO2 26 05/09/2019     No results for input(s): LIPASE, AMYLASE in the last 72 hours. Physical exam:   BP (!) 140/74 (Site: Left Upper Arm, Position: Sitting, Cuff Size: Medium Adult)   Pulse 94   Temp 99 °F (37.2 °C) (Oral)   Resp 18   Ht 6' (1.829 m)   Wt 200 lb (90.7 kg)   SpO2 96%   BMI 27.12 kg/m²   General appearance: NAD  Head: NCAT  Neck: supple, no masses  Lungs: equal chest rise bilateral  Heart: S1S2 present  Abdomen: soft, nontender, nondistended  Skin; no new lesions, incisions clean and intact  Gu: no cva tenderness  Extremities: extremities normal, atraumatic, no cyanosis or edema    A:  Post op lap right hemicolectomy with minor surgical site dermatitis    P: follow up as needed. Will give 7 days bactrim.  Ok to start neck radiation    Navneet Garcia MD  5/20/2019

## 2019-08-22 ENCOUNTER — OFFICE VISIT (OUTPATIENT)
Dept: SURGERY | Age: 64
End: 2019-08-22
Payer: MEDICARE

## 2019-08-22 VITALS
BODY MASS INDEX: 25.06 KG/M2 | HEIGHT: 72 IN | SYSTOLIC BLOOD PRESSURE: 111 MMHG | TEMPERATURE: 97.7 F | HEART RATE: 96 BPM | DIASTOLIC BLOOD PRESSURE: 59 MMHG | WEIGHT: 185 LBS | RESPIRATION RATE: 16 BRPM

## 2019-08-22 DIAGNOSIS — L76.82 INCISIONAL PAIN: Primary | ICD-10-CM

## 2019-08-22 PROCEDURE — 4040F PNEUMOC VAC/ADMIN/RCVD: CPT | Performed by: SURGERY

## 2019-08-22 PROCEDURE — G8428 CUR MEDS NOT DOCUMENT: HCPCS | Performed by: SURGERY

## 2019-08-22 PROCEDURE — 1123F ACP DISCUSS/DSCN MKR DOCD: CPT | Performed by: SURGERY

## 2019-08-22 PROCEDURE — 3017F COLORECTAL CA SCREEN DOC REV: CPT | Performed by: SURGERY

## 2019-08-22 PROCEDURE — 4004F PT TOBACCO SCREEN RCVD TLK: CPT | Performed by: SURGERY

## 2019-08-22 PROCEDURE — 99213 OFFICE O/P EST LOW 20 MIN: CPT | Performed by: SURGERY

## 2019-08-22 PROCEDURE — G8417 CALC BMI ABV UP PARAM F/U: HCPCS | Performed by: SURGERY

## 2020-02-19 ENCOUNTER — APPOINTMENT (OUTPATIENT)
Dept: CT IMAGING | Age: 65
DRG: 193 | End: 2020-02-19
Payer: MEDICARE

## 2020-02-19 ENCOUNTER — APPOINTMENT (OUTPATIENT)
Dept: GENERAL RADIOLOGY | Age: 65
DRG: 193 | End: 2020-02-19
Payer: MEDICARE

## 2020-02-19 ENCOUNTER — HOSPITAL ENCOUNTER (INPATIENT)
Age: 65
LOS: 3 days | Discharge: HOME | DRG: 193 | End: 2020-02-23
Attending: EMERGENCY MEDICINE | Admitting: FAMILY MEDICINE
Payer: MEDICARE

## 2020-02-19 PROBLEM — R41.82 AMS (ALTERED MENTAL STATUS): Status: ACTIVE | Noted: 2020-02-19

## 2020-02-19 LAB
ACETAMINOPHEN LEVEL: <5 MCG/ML (ref 10–30)
ALBUMIN SERPL-MCNC: 3 G/DL (ref 3.5–5.2)
ALP BLD-CCNC: 300 U/L (ref 40–129)
ALT SERPL-CCNC: 25 U/L (ref 0–40)
AMPHETAMINE SCREEN, URINE: NOT DETECTED
ANION GAP SERPL CALCULATED.3IONS-SCNC: 11 MMOL/L (ref 7–16)
APTT: 29.6 SEC (ref 24.5–35.1)
AST SERPL-CCNC: 23 U/L (ref 0–39)
B.E.: 3.6 MMOL/L (ref -3–3)
BARBITURATE SCREEN URINE: NOT DETECTED
BASOPHILS ABSOLUTE: 0.01 E9/L (ref 0–0.2)
BASOPHILS RELATIVE PERCENT: 0.1 % (ref 0–2)
BENZODIAZEPINE SCREEN, URINE: NOT DETECTED
BILIRUB SERPL-MCNC: 0.3 MG/DL (ref 0–1.2)
BILIRUBIN URINE: NEGATIVE
BLOOD, URINE: NEGATIVE
BUN BLDV-MCNC: 12 MG/DL (ref 8–23)
CALCIUM SERPL-MCNC: 12 MG/DL (ref 8.6–10.2)
CANNABINOID SCREEN URINE: NOT DETECTED
CHLORIDE BLD-SCNC: 95 MMOL/L (ref 98–107)
CHP ED QC CHECK: YES
CLARITY: CLEAR
CO2: 29 MMOL/L (ref 22–29)
COCAINE METABOLITE SCREEN URINE: NOT DETECTED
COHB: 1.2 % (ref 0–1.5)
COLOR: YELLOW
CREAT SERPL-MCNC: 0.9 MG/DL (ref 0.7–1.2)
CRITICAL: ABNORMAL
DATE ANALYZED: ABNORMAL
DATE OF COLLECTION: ABNORMAL
EOSINOPHILS ABSOLUTE: 0.01 E9/L (ref 0.05–0.5)
EOSINOPHILS RELATIVE PERCENT: 0.1 % (ref 0–6)
ETHANOL: <10 MG/DL (ref 0–0.08)
FENTANYL SCREEN, URINE: NOT DETECTED
GFR AFRICAN AMERICAN: >60
GFR NON-AFRICAN AMERICAN: >60 ML/MIN/1.73
GLUCOSE BLD-MCNC: 20 MG/DL (ref 74–99)
GLUCOSE BLD-MCNC: 78 MG/DL
GLUCOSE URINE: NEGATIVE MG/DL
HCO3: 27.6 MMOL/L (ref 22–26)
HCT VFR BLD CALC: 34.8 % (ref 37–54)
HEMOGLOBIN: 11 G/DL (ref 12.5–16.5)
HHB: 5.5 % (ref 0–5)
IMMATURE GRANULOCYTES #: 0.07 E9/L
IMMATURE GRANULOCYTES %: 0.6 % (ref 0–5)
INR BLD: 1.1
KETONES, URINE: NEGATIVE MG/DL
LAB: ABNORMAL
LEUKOCYTE ESTERASE, URINE: NEGATIVE
LYMPHOCYTES ABSOLUTE: 0.46 E9/L (ref 1.5–4)
LYMPHOCYTES RELATIVE PERCENT: 4.1 % (ref 20–42)
Lab: ABNORMAL
Lab: ABNORMAL
MCH RBC QN AUTO: 27 PG (ref 26–35)
MCHC RBC AUTO-ENTMCNC: 31.6 % (ref 32–34.5)
MCV RBC AUTO: 85.5 FL (ref 80–99.9)
METER GLUCOSE: 120 MG/DL (ref 74–99)
METER GLUCOSE: 78 MG/DL (ref 74–99)
METER GLUCOSE: <40 MG/DL (ref 74–99)
METHADONE SCREEN, URINE: NOT DETECTED
METHB: 0.3 % (ref 0–1.5)
MODE: ABNORMAL
MONOCYTES ABSOLUTE: 0.46 E9/L (ref 0.1–0.95)
MONOCYTES RELATIVE PERCENT: 4.1 % (ref 2–12)
NEUTROPHILS ABSOLUTE: 10.27 E9/L (ref 1.8–7.3)
NEUTROPHILS RELATIVE PERCENT: 91 % (ref 43–80)
NITRITE, URINE: NEGATIVE
O2 CONTENT: 15.5 ML/DL
O2 SATURATION: 94.4 % (ref 92–98.5)
O2HB: 93 % (ref 94–97)
OPERATOR ID: 7278
OPIATE SCREEN URINE: POSITIVE
OVALOCYTES: ABNORMAL
OXYCODONE URINE: NOT DETECTED
PATIENT TEMP: 37 C
PCO2: 39.4 MMHG (ref 35–45)
PDW BLD-RTO: 13.9 FL (ref 11.5–15)
PH BLOOD GAS: 7.46 (ref 7.35–7.45)
PH UA: 6.5 (ref 5–9)
PHENCYCLIDINE SCREEN URINE: NOT DETECTED
PLATELET # BLD: 489 E9/L (ref 130–450)
PMV BLD AUTO: 8.6 FL (ref 7–12)
PO2: 73.4 MMHG (ref 75–100)
POIKILOCYTES: ABNORMAL
POLYCHROMASIA: ABNORMAL
POTASSIUM SERPL-SCNC: 2.6 MMOL/L (ref 3.5–5)
PROCALCITONIN: 0.1 NG/ML (ref 0–0.08)
PROTEIN UA: NEGATIVE MG/DL
PROTHROMBIN TIME: 12.7 SEC (ref 9.3–12.4)
RBC # BLD: 4.07 E12/L (ref 3.8–5.8)
SALICYLATE, SERUM: <0.3 MG/DL (ref 0–30)
SODIUM BLD-SCNC: 135 MMOL/L (ref 132–146)
SOURCE, BLOOD GAS: ABNORMAL
SPECIFIC GRAVITY UA: <=1.005 (ref 1–1.03)
THB: 11.8 G/DL (ref 11.5–16.5)
TIME ANALYZED: 1024
TOTAL CK: 39 U/L (ref 20–200)
TOTAL PROTEIN: 7.2 G/DL (ref 6.4–8.3)
TRICYCLIC ANTIDEPRESSANTS SCREEN SERUM: NEGATIVE NG/ML
TROPONIN: <0.01 NG/ML (ref 0–0.03)
UROBILINOGEN, URINE: 0.2 E.U./DL
WBC # BLD: 11.3 E9/L (ref 4.5–11.5)

## 2020-02-19 PROCEDURE — 82550 ASSAY OF CK (CPK): CPT

## 2020-02-19 PROCEDURE — G0480 DRUG TEST DEF 1-7 CLASSES: HCPCS

## 2020-02-19 PROCEDURE — 93005 ELECTROCARDIOGRAM TRACING: CPT | Performed by: EMERGENCY MEDICINE

## 2020-02-19 PROCEDURE — 85025 COMPLETE CBC W/AUTO DIFF WBC: CPT

## 2020-02-19 PROCEDURE — 70450 CT HEAD/BRAIN W/O DYE: CPT

## 2020-02-19 PROCEDURE — 96366 THER/PROPH/DIAG IV INF ADDON: CPT

## 2020-02-19 PROCEDURE — 51702 INSERT TEMP BLADDER CATH: CPT

## 2020-02-19 PROCEDURE — 96365 THER/PROPH/DIAG IV INF INIT: CPT

## 2020-02-19 PROCEDURE — 99291 CRITICAL CARE FIRST HOUR: CPT

## 2020-02-19 PROCEDURE — 80053 COMPREHEN METABOLIC PANEL: CPT

## 2020-02-19 PROCEDURE — 71045 X-RAY EXAM CHEST 1 VIEW: CPT

## 2020-02-19 PROCEDURE — 87040 BLOOD CULTURE FOR BACTERIA: CPT

## 2020-02-19 PROCEDURE — 82962 GLUCOSE BLOOD TEST: CPT

## 2020-02-19 PROCEDURE — 2580000003 HC RX 258: Performed by: EMERGENCY MEDICINE

## 2020-02-19 PROCEDURE — 87206 SMEAR FLUORESCENT/ACID STAI: CPT

## 2020-02-19 PROCEDURE — 85730 THROMBOPLASTIN TIME PARTIAL: CPT

## 2020-02-19 PROCEDURE — 2500000003 HC RX 250 WO HCPCS: Performed by: EMERGENCY MEDICINE

## 2020-02-19 PROCEDURE — 84484 ASSAY OF TROPONIN QUANT: CPT

## 2020-02-19 PROCEDURE — 81003 URINALYSIS AUTO W/O SCOPE: CPT

## 2020-02-19 PROCEDURE — 36415 COLL VENOUS BLD VENIPUNCTURE: CPT

## 2020-02-19 PROCEDURE — 82805 BLOOD GASES W/O2 SATURATION: CPT

## 2020-02-19 PROCEDURE — 84145 PROCALCITONIN (PCT): CPT

## 2020-02-19 PROCEDURE — 87070 CULTURE OTHR SPECIMN AEROBIC: CPT

## 2020-02-19 PROCEDURE — 80307 DRUG TEST PRSMV CHEM ANLYZR: CPT

## 2020-02-19 PROCEDURE — 85610 PROTHROMBIN TIME: CPT

## 2020-02-19 PROCEDURE — 94760 N-INVAS EAR/PLS OXIMETRY 1: CPT

## 2020-02-19 PROCEDURE — 96368 THER/DIAG CONCURRENT INF: CPT

## 2020-02-19 PROCEDURE — 96367 TX/PROPH/DG ADDL SEQ IV INF: CPT

## 2020-02-19 PROCEDURE — 6360000002 HC RX W HCPCS: Performed by: EMERGENCY MEDICINE

## 2020-02-19 PROCEDURE — 6370000000 HC RX 637 (ALT 250 FOR IP): Performed by: FAMILY MEDICINE

## 2020-02-19 RX ORDER — DULOXETIN HYDROCHLORIDE 30 MG/1
30 CAPSULE, DELAYED RELEASE ORAL DAILY
COMMUNITY

## 2020-02-19 RX ORDER — ATORVASTATIN CALCIUM 40 MG/1
40 TABLET, FILM COATED ORAL NIGHTLY
Status: DISCONTINUED | OUTPATIENT
Start: 2020-02-19 | End: 2020-02-23 | Stop reason: HOSPADM

## 2020-02-19 RX ORDER — CYCLOBENZAPRINE HCL 10 MG
10 TABLET ORAL 3 TIMES DAILY PRN
COMMUNITY

## 2020-02-19 RX ORDER — SODIUM CHLORIDE 9 MG/ML
INJECTION, SOLUTION INTRAVENOUS CONTINUOUS
Status: DISCONTINUED | OUTPATIENT
Start: 2020-02-19 | End: 2020-02-21

## 2020-02-19 RX ORDER — POTASSIUM CHLORIDE 20 MEQ/1
40 TABLET, EXTENDED RELEASE ORAL ONCE
Status: DISCONTINUED | OUTPATIENT
Start: 2020-02-19 | End: 2020-02-19

## 2020-02-19 RX ORDER — CYCLOBENZAPRINE HCL 10 MG
10 TABLET ORAL 3 TIMES DAILY PRN
Status: DISCONTINUED | OUTPATIENT
Start: 2020-02-19 | End: 2020-02-23 | Stop reason: HOSPADM

## 2020-02-19 RX ORDER — DULOXETIN HYDROCHLORIDE 30 MG/1
30 CAPSULE, DELAYED RELEASE ORAL DAILY
Status: DISCONTINUED | OUTPATIENT
Start: 2020-02-20 | End: 2020-02-23 | Stop reason: HOSPADM

## 2020-02-19 RX ORDER — PANTOPRAZOLE SODIUM 40 MG/1
40 TABLET, DELAYED RELEASE ORAL EVERY MORNING
Status: DISCONTINUED | OUTPATIENT
Start: 2020-02-20 | End: 2020-02-21 | Stop reason: SDUPTHER

## 2020-02-19 RX ORDER — AMLODIPINE BESYLATE 10 MG/1
10 TABLET ORAL DAILY
Status: DISCONTINUED | OUTPATIENT
Start: 2020-02-19 | End: 2020-02-23 | Stop reason: HOSPADM

## 2020-02-19 RX ORDER — DEXTROSE MONOHYDRATE 25 G/50ML
25 INJECTION, SOLUTION INTRAVENOUS ONCE
Status: COMPLETED | OUTPATIENT
Start: 2020-02-19 | End: 2020-02-19

## 2020-02-19 RX ORDER — ICOSAPENT ETHYL 1000 MG/1
2 CAPSULE ORAL 2 TIMES DAILY
Status: DISCONTINUED | OUTPATIENT
Start: 2020-02-19 | End: 2020-02-20 | Stop reason: CLARIF

## 2020-02-19 RX ORDER — 0.9 % SODIUM CHLORIDE 0.9 %
1000 INTRAVENOUS SOLUTION INTRAVENOUS ONCE
Status: COMPLETED | OUTPATIENT
Start: 2020-02-19 | End: 2020-02-19

## 2020-02-19 RX ORDER — PANTOPRAZOLE SODIUM 40 MG/1
40 TABLET, DELAYED RELEASE ORAL
Status: DISCONTINUED | OUTPATIENT
Start: 2020-02-20 | End: 2020-02-19

## 2020-02-19 RX ORDER — GABAPENTIN 400 MG/1
800 CAPSULE ORAL 3 TIMES DAILY
Status: DISCONTINUED | OUTPATIENT
Start: 2020-02-19 | End: 2020-02-23 | Stop reason: HOSPADM

## 2020-02-19 RX ORDER — POTASSIUM CHLORIDE 7.45 MG/ML
10 INJECTION INTRAVENOUS ONCE
Status: COMPLETED | OUTPATIENT
Start: 2020-02-19 | End: 2020-02-19

## 2020-02-19 RX ORDER — LOSARTAN POTASSIUM 50 MG/1
50 TABLET ORAL DAILY
Status: DISCONTINUED | OUTPATIENT
Start: 2020-02-19 | End: 2020-02-23 | Stop reason: HOSPADM

## 2020-02-19 RX ORDER — ASPIRIN 81 MG/1
81 TABLET ORAL DAILY
Status: DISCONTINUED | OUTPATIENT
Start: 2020-02-19 | End: 2020-02-21

## 2020-02-19 RX ORDER — FAMOTIDINE 20 MG/1
40 TABLET, FILM COATED ORAL DAILY
Status: DISCONTINUED | OUTPATIENT
Start: 2020-02-19 | End: 2020-02-21

## 2020-02-19 RX ORDER — HYDROCODONE BITARTRATE AND ACETAMINOPHEN 10; 325 MG/1; MG/1
1 TABLET ORAL EVERY 6 HOURS PRN
Status: DISCONTINUED | OUTPATIENT
Start: 2020-02-19 | End: 2020-02-23 | Stop reason: HOSPADM

## 2020-02-19 RX ORDER — TIZANIDINE 4 MG/1
4 TABLET ORAL EVERY 8 HOURS PRN
Status: DISCONTINUED | OUTPATIENT
Start: 2020-02-19 | End: 2020-02-20 | Stop reason: ALTCHOICE

## 2020-02-19 RX ORDER — DIPHENHYDRAMINE HCL 25 MG
25 TABLET ORAL EVERY 6 HOURS PRN
Status: DISCONTINUED | OUTPATIENT
Start: 2020-02-19 | End: 2020-02-23 | Stop reason: HOSPADM

## 2020-02-19 RX ADMIN — ASPIRIN 81 MG: 81 TABLET, COATED ORAL at 22:56

## 2020-02-19 RX ADMIN — DEXTROSE MONOHYDRATE 25 G: 25 INJECTION, SOLUTION INTRAVENOUS at 10:30

## 2020-02-19 RX ADMIN — POTASSIUM CHLORIDE 10 MEQ: 7.46 INJECTION, SOLUTION INTRAVENOUS at 12:59

## 2020-02-19 RX ADMIN — AMLODIPINE BESYLATE 10 MG: 10 TABLET ORAL at 22:31

## 2020-02-19 RX ADMIN — ATORVASTATIN CALCIUM 40 MG: 40 TABLET, FILM COATED ORAL at 22:50

## 2020-02-19 RX ADMIN — ROFLUMILAST 500 MCG: 500 TABLET ORAL at 22:49

## 2020-02-19 RX ADMIN — Medication 5 ML: at 16:14

## 2020-02-19 RX ADMIN — POTASSIUM CHLORIDE 10 MEQ: 7.46 INJECTION, SOLUTION INTRAVENOUS at 12:02

## 2020-02-19 RX ADMIN — CEFTRIAXONE 2 G: 2 INJECTION, POWDER, FOR SOLUTION INTRAMUSCULAR; INTRAVENOUS at 12:17

## 2020-02-19 RX ADMIN — HYDROCODONE BITARTRATE AND ACETAMINOPHEN 1 TABLET: 10; 325 TABLET ORAL at 19:39

## 2020-02-19 RX ADMIN — SODIUM CHLORIDE 1000 ML: 9 INJECTION, SOLUTION INTRAVENOUS at 11:07

## 2020-02-19 RX ADMIN — CYCLOBENZAPRINE 10 MG: 10 TABLET, FILM COATED ORAL at 19:39

## 2020-02-19 RX ADMIN — LOSARTAN POTASSIUM 50 MG: 25 TABLET, FILM COATED ORAL at 22:31

## 2020-02-19 RX ADMIN — SODIUM CHLORIDE: 9 INJECTION, SOLUTION INTRAVENOUS at 15:02

## 2020-02-19 RX ADMIN — GABAPENTIN 800 MG: 400 CAPSULE ORAL at 22:50

## 2020-02-19 RX ADMIN — DOXYCYCLINE 100 MG: 100 INJECTION, POWDER, LYOPHILIZED, FOR SOLUTION INTRAVENOUS at 12:50

## 2020-02-19 RX ADMIN — METFORMIN HYDROCHLORIDE 1000 MG: 1000 TABLET ORAL at 22:56

## 2020-02-19 RX ADMIN — Medication 5 ML: at 22:50

## 2020-02-19 ASSESSMENT — PAIN SCALES - GENERAL: PAINLEVEL_OUTOF10: 8

## 2020-02-19 NOTE — CARE COORDINATION
CM note. Discussed d/c plans with Ally Sriram has been to a [place] facility in the past and he adamantly refuses to go to rehab at d/c. He wants to return to home. Pt attempted to call his son from my cell phone, no answer,  he left him a voicemail message to call back. Pt may need an [facility] APS referral prior to d/c. Left voicemail message with [address] [place] [address] manager to update her.   Remington Flores RN CM

## 2020-02-19 NOTE — CARE COORDINATION
CM transition of care. Pt presented to [facility] Ed from home with AMS. Pt has a care manager through [facility]. Pt care manager is Deepa Valentin ([phone]). Spoke with Belkys Eagle who reports she was at the patients home currently to assess him, but pt is here in the ER. Pt currently has no services in place. Belkys Eagle asked that we inform her of the d/c plan for pt to be assessed for appropriate services. Assigned CM/SW to follow for d/c plan.      Neo Fong RN CM

## 2020-02-19 NOTE — ED PROVIDER NOTES
present  Cardiovascular:      Rate and Rhythm: Normal rate and regular rhythm. Pulses: Normal pulses. Heart sounds: No murmur. Pulmonary:      Effort: Pulmonary effort is normal.      Breath sounds: Transmitted upper airway sounds present. Rales present. No wheezing or rhonchi. Abdominal:      General: Bowel sounds are normal.      Palpations: Abdomen is soft. Abdomen is not rigid. There is no pulsatile mass. Tenderness: There is no abdominal tenderness. There is no guarding or rebound. Comments: G-tube site, clean and dry   Musculoskeletal:      Right lower leg: No edema. Left lower leg: No edema. Skin:     General: Skin is warm and dry. Capillary Refill: Capillary refill takes less than 2 seconds. Neurological:      General: No focal deficit present. Mental Status: He is lethargic. Comments: Patient initially lethargic, after resuscitation was alert and oriented   Psychiatric:      Comments: Patient initially noncommunicative with hypoglycemia as patient was able to speak with us after treatment resuscitation. Procedures     MDM  Number of Diagnoses or Management Options  Hypercalcemia:   Hypoglycemia:   Hypokalemia:   Pneumonia due to organism:   Diagnosis management comments: Patient presents to the ED for altered mental status as patient arrived hypoglycemic. Mentation improved with glucose improved with D50. Patient was further evaluated for his altered mentation and was found to have potassium of 2.6. With patient's low potassium, he was given IV potassium here in the ED. Patient unable to take p.o. medication as he warrants close following to further improve his potassium level. Patient was also given antibiotics for concern of pneumonia on lung exam and chest x-ray. Patient also has abnormal chest x-ray findings of masslike figure as patient has known history of cancer. Patient continues to smoke.   Patient admits that he has not had good tracheostomy care recently and there is suspicion of possible underlying infection. Cultures obtained and can be followed. Case discussed with medicine who agrees with admission. Patient requires continued workup and management of their symptoms and will be admitted to the hospital for further evaluation and treatment. Patient agrees with the plan and all questions were answered. ED Course as of Feb 19 1651 Wed Feb 19, 2020   1226 Patient reassessed. Patient more stable at this time. Patient agrees admission to the hospital for further evaluation of current findings and trach evaluation    [MA]   1508 Discussed case with Dr. Nicole Moreau, agrees with admission    [MA]      ED Course User Index  [MA] Mala Cuba, DO          ----------------------------------------------- PAST HISTORY --------------------------------------------  Past Medical History:  has a past medical history of Bilateral lower extremity edema, Cancer ([facility] [address] Chronic back pain, COPD (chronic obstructive pulmonary disease) ([facility] [address] Depression, Diabetes mellitus ([address] GERD (gastroesophageal reflux disease), Headache(784.0), Hyperlipidemia, Hypertension, Migraines, Neck pain, chronic, Numbness and tingling, Osteoarthritis, Type II or unspecified type diabetes mellitus without mention of complication, not stated as uncontrolled, Unspecified sleep apnea, and Vitamin D deficiency. Past Surgical History:  has a past surgical history that includes back surgery (37 yrs ago); other surgical history (2/15/12); Rotator cuff repair; lipoma resection; rhinoplasty; other surgical history (N/A, 1/29/2014); Colonoscopy; Upper gastrointestinal endoscopy; and Small intestine surgery (Right, 5/7/2019). Social History:  reports that he has quit smoking. His smoking use included cigarettes and cigars. He has a 23.50 pack-year smoking history. He has never used smokeless tobacco. He reports that he does not drink alcohol or use drugs.     Family History: family history includes Cancer in his mother; Coronary Art Dis in his father and mother; Diabetes in his maternal grandmother; Heart Attack in his father; Other in his father and mother. The patients home medications have been reviewed.     Allergies: Benzonatate; Procaine hcl; Roxicet [oxycodone-acetaminophen]; and Dye [barium-containing compounds]    ------------------------------------------------ RESULTS ---------------------------------------------------    LABS:  Results for orders placed or performed during the hospital encounter of 02/19/20   CBC Auto Differential   Result Value Ref Range    WBC 11.3 4.5 - 11.5 E9/L    RBC 4.07 3.80 - 5.80 E12/L    Hemoglobin 11.0 (L) 12.5 - 16.5 g/dL    Hematocrit 34.8 (L) 37.0 - 54.0 %    MCV 85.5 80.0 - 99.9 fL    MCH 27.0 26.0 - 35.0 pg    MCHC 31.6 (L) 32.0 - 34.5 %    RDW 13.9 11.5 - 15.0 fL    Platelets 768 (H) 210 - 450 E9/L    MPV 8.6 7.0 - 12.0 fL    Neutrophils % 91.0 (H) 43.0 - 80.0 %    Immature Granulocytes % 0.6 0.0 - 5.0 %    Lymphocytes % 4.1 (L) 20.0 - 42.0 %    Monocytes % 4.1 2.0 - 12.0 %    Eosinophils % 0.1 0.0 - 6.0 %    Basophils % 0.1 0.0 - 2.0 %    Neutrophils Absolute 10.27 (H) 1.80 - 7.30 E9/L    Immature Granulocytes # 0.07 E9/L    Lymphocytes Absolute 0.46 (L) 1.50 - 4.00 E9/L    Monocytes Absolute 0.46 0.10 - 0.95 E9/L    Eosinophils Absolute 0.01 (L) 0.05 - 0.50 E9/L    Basophils Absolute 0.01 0.00 - 0.20 E9/L    Polychromasia 1+     Poikilocytes 1+     Ovalocytes 1+    APTT   Result Value Ref Range    aPTT 29.6 24.5 - 35.1 sec   Protime-INR   Result Value Ref Range    Protime 12.7 (H) 9.3 - 12.4 sec    INR 1.1    Troponin   Result Value Ref Range    Troponin <0.01 0.00 - 0.03 ng/mL   Comprehensive Metabolic Panel   Result Value Ref Range    Sodium 135 132 - 146 mmol/L    Potassium 2.6 (LL) 3.5 - 5.0 mmol/L    Chloride 95 (L) 98 - 107 mmol/L    CO2 29 22 - 29 mmol/L    Anion Gap 11 7 - 16 mmol/L    Glucose 20 (LL) 74 - 99 mg/dL    BUN 12 8 - 23 mg/dL    CREATININE 0.9 0.7 - 1.2 mg/dL    GFR Non-African American >60 >=60 mL/min/1.73    GFR African American >60     Calcium 12.0 (H) 8.6 - 10.2 mg/dL    Total Protein 7.2 6.4 - 8.3 g/dL    Alb 3.0 (L) 3.5 - 5.2 g/dL    Total Bilirubin 0.3 0.0 - 1.2 mg/dL    Alkaline Phosphatase 300 (H) 40 - 129 U/L    ALT 25 0 - 40 U/L    AST 23 0 - 39 U/L   Urinalysis   Result Value Ref Range    Color, UA Yellow Straw/Yellow    Clarity, UA Clear Clear    Glucose, Ur Negative Negative mg/dL    Bilirubin Urine Negative Negative    Ketones, Urine Negative Negative mg/dL    Specific Gravity, UA <=1.005 1.005 - 1.030    Blood, Urine Negative Negative    pH, UA 6.5 5.0 - 9.0    Protein, UA Negative Negative mg/dL    Urobilinogen, Urine 0.2 <2.0 E.U./dL    Nitrite, Urine Negative Negative    Leukocyte Esterase, Urine Negative Negative   CK   Result Value Ref Range    Total CK 39 20 - 200 U/L   Serum Drug Screen   Result Value Ref Range    Ethanol Lvl <10 mg/dL    Acetaminophen Level <5.0 (L) 10.0 - 53.5 mcg/mL    Salicylate, Serum <9.3 0.0 - 30.0 mg/dL    TCA Scrn NEGATIVE Cutoff:300 ng/mL   Urine Drug Screen   Result Value Ref Range    Amphetamine Screen, Urine NOT DETECTED Negative <1000 ng/mL    Barbiturate Screen, Ur NOT DETECTED Negative < 200 ng/mL    Benzodiazepine Screen, Urine NOT DETECTED Negative < 200 ng/mL    Cannabinoid Scrn, Ur NOT DETECTED Negative < 50ng/mL    Cocaine Metabolite Screen, Urine NOT DETECTED Negative < 300 ng/mL    Opiate Scrn, Ur POSITIVE (A) Negative < 300ng/mL    PCP Screen, Urine NOT DETECTED Negative < 25 ng/mL    Methadone Screen, Urine NOT DETECTED Negative <300 ng/mL    Oxycodone Urine NOT DETECTED Negative <100 ng/mL    FENTANYL SCREEN, URINE NOT DETECTED Negative <1 ng/mL    Drug Screen Comment: see below    Blood Gas, Arterial   Result Value Ref Range    Date Analyzed 20200219     Time Analyzed 1024     Source: Blood Arterial     pH, Blood Gas 7.463 (H) 7.350 - 7.450    PCO2 39.4 35.0 - 45.0 mmHg    PO2 73.4 (L) 75.0 - 100.0 mmHg    HCO3 27.6 (H) 22.0 - 26.0 mmol/L    B.E. 3.6 (H) -3.0 - 3.0 mmol/L    O2 Sat 94.4 92.0 - 98.5 %    O2Hb 93.0 (L) 94.0 - 97.0 %    COHb 1.2 0.0 - 1.5 %    MetHb 0.3 0.0 - 1.5 %    O2 Content 15.5 mL/dL    HHb 5.5 (H) 0.0 - 5.0 %    tHb (est) 11.8 11.5 - 16.5 g/dL    Mode NRB 15L     Date Of Collection      Time Collected      Pt Temp 37.0 C     ID [id number]     Lab [id number]     Critical(s) Notified . No Critical Values    POCT Glucose   Result Value Ref Range    Glucose 78 mg/dL    QC OK? yes    POCT Glucose   Result Value Ref Range    Meter Glucose <40 (L) 74 - 99 mg/dL   POCT Glucose   Result Value Ref Range    Meter Glucose 78 74 - 99 mg/dL   EKG 12 Lead   Result Value Ref Range    Ventricular Rate 73 BPM    Atrial Rate 288 BPM    QRS Duration 92 ms    Q-T Interval 418 ms    QTc Calculation (Bazett) 460 ms    R Axis 28 degrees    T Axis 79 degrees       RADIOLOGY:    All Radiology results interpreted by Radiologist unless otherwise noted. CT Head WO Contrast   Final Result   No significant abnormal findings. XR CHEST PORTABLE   Final Result   CHF superimposed on COPD with left basilar infiltrates. Focal nodular masslike densities in the lungs bilaterally,   indeterminate for developing malignancy. CT assessment is recommended. ALERT:  THIS IS AN ABNORMAL REPORT                            EKG:  This EKG is signed and interpreted by ED Physician. Time: 1049  Rate: 73  Rhythm: Atrial fibrillation. Interpretation: Atrial fibrillation  Comparison: changes compared to previous EKG (NSR prior). ---------------------------- NURSING NOTES AND VITALS REVIEWED -------------------------   The nursing notes within the ED encounter and vital signs as below have been reviewed.    /86   Pulse 88   Temp 96.2 °F (35.7 °C) (Temporal)   Resp 15   SpO2 96%   Oxygen Saturation Interpretation: Normal      ------------------------------------------PROGRESS NOTES DO  Resident  02/19/20 5603

## 2020-02-19 NOTE — ED NOTES
Bed: 08  Expected date:   Expected time:   Means of arrival:   Comments:  EMS     Ken Birch RN  02/19/20 1012

## 2020-02-20 LAB
ANION GAP SERPL CALCULATED.3IONS-SCNC: 6 MMOL/L (ref 7–16)
BUN BLDV-MCNC: 15 MG/DL (ref 8–23)
CALCIUM SERPL-MCNC: 11.4 MG/DL (ref 8.6–10.2)
CHLORIDE BLD-SCNC: 98 MMOL/L (ref 98–107)
CO2: 31 MMOL/L (ref 22–29)
CREAT SERPL-MCNC: 0.9 MG/DL (ref 0.7–1.2)
GFR AFRICAN AMERICAN: >60
GFR NON-AFRICAN AMERICAN: >60 ML/MIN/1.73
GLUCOSE BLD-MCNC: 152 MG/DL (ref 74–99)
HCT VFR BLD CALC: 33.5 % (ref 37–54)
HEMOGLOBIN: 10.5 G/DL (ref 12.5–16.5)
MCH RBC QN AUTO: 27.1 PG (ref 26–35)
MCHC RBC AUTO-ENTMCNC: 31.3 % (ref 32–34.5)
MCV RBC AUTO: 86.6 FL (ref 80–99.9)
PDW BLD-RTO: 14.2 FL (ref 11.5–15)
PLATELET # BLD: 460 E9/L (ref 130–450)
PMV BLD AUTO: 9.1 FL (ref 7–12)
POTASSIUM SERPL-SCNC: 4.3 MMOL/L (ref 3.5–5)
RBC # BLD: 3.87 E12/L (ref 3.8–5.8)
SODIUM BLD-SCNC: 135 MMOL/L (ref 132–146)
WBC # BLD: 10.1 E9/L (ref 4.5–11.5)

## 2020-02-20 PROCEDURE — 85027 COMPLETE CBC AUTOMATED: CPT

## 2020-02-20 PROCEDURE — 6370000000 HC RX 637 (ALT 250 FOR IP): Performed by: EMERGENCY MEDICINE

## 2020-02-20 PROCEDURE — 2580000003 HC RX 258: Performed by: FAMILY MEDICINE

## 2020-02-20 PROCEDURE — 2500000003 HC RX 250 WO HCPCS: Performed by: FAMILY MEDICINE

## 2020-02-20 PROCEDURE — 6360000002 HC RX W HCPCS: Performed by: FAMILY MEDICINE

## 2020-02-20 PROCEDURE — 6370000000 HC RX 637 (ALT 250 FOR IP): Performed by: FAMILY MEDICINE

## 2020-02-20 PROCEDURE — 36415 COLL VENOUS BLD VENIPUNCTURE: CPT

## 2020-02-20 PROCEDURE — 80048 BASIC METABOLIC PNL TOTAL CA: CPT

## 2020-02-20 PROCEDURE — 2060000000 HC ICU INTERMEDIATE R&B

## 2020-02-20 RX ADMIN — DOXYCYCLINE 100 MG: 100 INJECTION, POWDER, LYOPHILIZED, FOR SOLUTION INTRAVENOUS at 00:41

## 2020-02-20 RX ADMIN — AMLODIPINE BESYLATE 10 MG: 10 TABLET ORAL at 10:27

## 2020-02-20 RX ADMIN — LOSARTAN POTASSIUM 50 MG: 25 TABLET, FILM COATED ORAL at 10:28

## 2020-02-20 RX ADMIN — GABAPENTIN 800 MG: 400 CAPSULE ORAL at 21:07

## 2020-02-20 RX ADMIN — CYCLOBENZAPRINE 10 MG: 10 TABLET, FILM COATED ORAL at 07:21

## 2020-02-20 RX ADMIN — CEFTRIAXONE SODIUM 1 G: 1 INJECTION, POWDER, FOR SOLUTION INTRAMUSCULAR; INTRAVENOUS at 12:35

## 2020-02-20 RX ADMIN — Medication 10 ML: at 15:22

## 2020-02-20 RX ADMIN — SODIUM CHLORIDE: 9 INJECTION, SOLUTION INTRAVENOUS at 07:40

## 2020-02-20 RX ADMIN — HYDROCODONE BITARTRATE AND ACETAMINOPHEN 1 TABLET: 10; 325 TABLET ORAL at 01:48

## 2020-02-20 RX ADMIN — DOXYCYCLINE 100 MG: 100 INJECTION, POWDER, LYOPHILIZED, FOR SOLUTION INTRAVENOUS at 12:36

## 2020-02-20 RX ADMIN — HYDROCODONE BITARTRATE AND ACETAMINOPHEN 1 TABLET: 10; 325 TABLET ORAL at 21:07

## 2020-02-20 RX ADMIN — PANTOPRAZOLE SODIUM 40 MG: 40 TABLET, DELAYED RELEASE ORAL at 10:28

## 2020-02-20 RX ADMIN — METFORMIN HYDROCHLORIDE 1000 MG: 1000 TABLET ORAL at 22:23

## 2020-02-20 RX ADMIN — FAMOTIDINE 40 MG: 20 TABLET ORAL at 10:28

## 2020-02-20 RX ADMIN — ATORVASTATIN CALCIUM 40 MG: 40 TABLET, FILM COATED ORAL at 22:24

## 2020-02-20 RX ADMIN — HYDROCODONE BITARTRATE AND ACETAMINOPHEN 1 TABLET: 10; 325 TABLET ORAL at 10:28

## 2020-02-20 ASSESSMENT — PAIN SCALES - GENERAL
PAINLEVEL_OUTOF10: 8
PAINLEVEL_OUTOF10: 8
PAINLEVEL_OUTOF10: 7
PAINLEVEL_OUTOF10: 9
PAINLEVEL_OUTOF10: 8
PAINLEVEL_OUTOF10: 8

## 2020-02-20 ASSESSMENT — PAIN DESCRIPTION - PROGRESSION
CLINICAL_PROGRESSION: NOT CHANGED
CLINICAL_PROGRESSION: NOT CHANGED

## 2020-02-20 ASSESSMENT — PAIN DESCRIPTION - LOCATION
LOCATION: THROAT
LOCATION: THROAT

## 2020-02-20 ASSESSMENT — PAIN DESCRIPTION - PAIN TYPE
TYPE: CHRONIC PAIN
TYPE: VISCERAL PAIN
TYPE: CHRONIC PAIN

## 2020-02-20 ASSESSMENT — ENCOUNTER SYMPTOMS
COLOR CHANGE: 0
ABDOMINAL PAIN: 0
SHORTNESS OF BREATH: 1

## 2020-02-20 ASSESSMENT — PAIN DESCRIPTION - FREQUENCY
FREQUENCY: CONTINUOUS
FREQUENCY: CONTINUOUS

## 2020-02-20 ASSESSMENT — PAIN DESCRIPTION - ORIENTATION
ORIENTATION: OTHER (COMMENT)
ORIENTATION: OTHER (COMMENT)

## 2020-02-20 ASSESSMENT — PAIN DESCRIPTION - DESCRIPTORS
DESCRIPTORS: ACHING;DISCOMFORT
DESCRIPTORS: ACHING;DISCOMFORT

## 2020-02-20 ASSESSMENT — PAIN - FUNCTIONAL ASSESSMENT
PAIN_FUNCTIONAL_ASSESSMENT: ACTIVITIES ARE NOT PREVENTED
PAIN_FUNCTIONAL_ASSESSMENT: ACTIVITIES ARE NOT PREVENTED

## 2020-02-20 ASSESSMENT — PAIN DESCRIPTION - ONSET
ONSET: ON-GOING
ONSET: ON-GOING

## 2020-02-20 NOTE — PROGRESS NOTES
Pharmacy Note    Jeimy Alvarado was ordered Icosapent Ethyl caps. As per the [address] [place] [address], herbals and certain dietary supplements will be discontinued.   The herbal or dietary supplement may be continued after discharge from the hospital.

## 2020-02-20 NOTE — H&P
tobacco: Never Used    Tobacco comment: 1/2 pack daily/2 cigars daily   Substance Use Topics    Alcohol use: No    Drug use: No       Current Facility-Administered Medications   Medication Dose Route Frequency Provider Last Rate Last Dose    0.9 % sodium chloride infusion   Intravenous Continuous Charlie Billy  mL/hr at 02/19/20 1502      amLODIPine (NORVASC) tablet 10 mg  10 mg Oral Daily Charlie Billy MD   10 mg at 02/19/20 2231    aspirin EC tablet 81 mg  81 mg Oral Daily Charlie Billy MD   81 mg at 02/19/20 2256    atorvastatin (LIPITOR) tablet 40 mg  40 mg Oral Nightly Charlie Billy MD   40 mg at 02/19/20 2250    gabapentin (NEURONTIN) capsule 800 mg  800 mg Oral TID Charlie Billy MD   800 mg at 02/19/20 2250    metFORMIN (GLUCOPHAGE) tablet 1,000 mg  1,000 mg Oral BID Charlie Billy MD   1,000 mg at 02/19/20 2256    Roflumilast (DALIRESP) tablet 500 mcg  500 mcg Oral Daily Charlie Billy MD   500 mcg at 02/19/20 2249    HYDROcodone-acetaminophen (NORCO)  MG per tablet 1 tablet  1 tablet Oral Q6H PRN Charlie Billy MD   1 tablet at 02/20/20 0148    losartan (COZAAR) tablet 50 mg  50 mg Oral Daily Charlie Billy MD   50 mg at 02/19/20 2231    pantoprazole (PROTONIX) tablet 40 mg  40 mg Oral QAM Charlie Billy MD        famotidine (PEPCID) tablet 40 mg  40 mg Oral Daily Charlie Billy MD        diphenhydrAMINE (BENADRYL) tablet 25 mg  25 mg Oral Q6H PRN Charlie Billy MD        cyclobenzaprine (FLEXERIL) tablet 10 mg  10 mg Oral TID PRN Charlie Billy MD   10 mg at 02/19/20 1939    DULoxetine (CYMBALTA) extended release capsule 30 mg  30 mg Oral Daily Charlie Billy MD        magic (miracle) mouthwash  5 mL Swish & Spit 4x Daily PRN Charlie Billy MD   5 mL at 02/19/20 2250    doxycycline (VIBRAMYCIN) 100 mg in dextrose 5 % 100 mL IVPB  100 mg Intravenous Q12H Todd Navarro MD   Stopped at 02/20/20 0226    cefTRIAXone (ROCEPHIN) 1 g in sterile water 10 mL IV syringe  1 g Intravenous Q24H Todd Navarro MD        enoxaparin (LOVENOX) injection 40 mg  40 mg Subcutaneous Daily Todd Navarro MD         Current Outpatient Medications   Medication Sig Dispense Refill    cyclobenzaprine (FLEXERIL) 10 MG tablet Take 10 mg by mouth 3 times daily as needed for Muscle spasms      DULoxetine (CYMBALTA) 30 MG extended release capsule Take 30 mg by mouth daily      Magic Mouthwash (MIRACLE MOUTHWASH) Swish and spit 5 mLs 4 times daily as needed for Irritation      Roflumilast (DALIRESP) 500 MCG tablet Take 500 mcg by mouth daily      HYDROcodone-acetaminophen (NORCO)  MG per tablet Take 1 tablet by mouth every 6 hours as needed for Pain. Cloyde Call losartan (COZAAR) 50 MG tablet Take 50 mg by mouth daily      pantoprazole (PROTONIX) 40 MG tablet Take 40 mg by mouth every morning       ranitidine (ZANTAC) 300 MG capsule Take 300 mg by mouth every evening       diphenhydrAMINE (BENADRYL) 25 MG tablet Take 25 mg by mouth daily as needed for Itching       glipiZIDE (GLUCOTROL) 10 MG tablet Take 10 mg by mouth 2 times daily.  atorvastatin (LIPITOR) 40 MG tablet Take 40 mg by mouth nightly.  gabapentin (NEURONTIN) 800 MG tablet   Take 800 mg by mouth 3 times daily       OXYGEN Inhale into the lungs Use as needed-  Mostly at night if needed, 31/2 liters         Allergies: Allergies   Allergen Reactions    Benzonatate Hives    Procaine Hcl Hives    Roxicet [Oxycodone-Acetaminophen] Hives    Dye [Barium-Containing Compounds]      CT dye causes hives and itching       Active Problems:    AMS (altered mental status)  Resolved Problems:    * No resolved hospital problems. *    Blood pressure (!) 142/71, pulse 94, temperature 96.7 °F (35.9 °C), temperature source Oral, resp.  rate 17, height 6' (1.829 m), weight 190 lb (86.2 kg), SpO2 94

## 2020-02-21 PROBLEM — E43 SEVERE PROTEIN-CALORIE MALNUTRITION (HCC): Chronic | Status: ACTIVE | Noted: 2020-02-21

## 2020-02-21 LAB
ANION GAP SERPL CALCULATED.3IONS-SCNC: 12 MMOL/L (ref 7–16)
BUN BLDV-MCNC: 14 MG/DL (ref 8–23)
CALCIUM SERPL-MCNC: 11.7 MG/DL (ref 8.6–10.2)
CHLORIDE BLD-SCNC: 98 MMOL/L (ref 98–107)
CO2: 27 MMOL/L (ref 22–29)
CREAT SERPL-MCNC: 0.8 MG/DL (ref 0.7–1.2)
CULTURE, RESPIRATORY: NORMAL
EKG ATRIAL RATE: 288 BPM
EKG Q-T INTERVAL: 418 MS
EKG QRS DURATION: 92 MS
EKG QTC CALCULATION (BAZETT): 460 MS
EKG R AXIS: 28 DEGREES
EKG T AXIS: 79 DEGREES
EKG VENTRICULAR RATE: 73 BPM
GFR AFRICAN AMERICAN: >60
GFR NON-AFRICAN AMERICAN: >60 ML/MIN/1.73
GLUCOSE BLD-MCNC: 151 MG/DL (ref 74–99)
HCT VFR BLD CALC: 36.7 % (ref 37–54)
HEMOGLOBIN: 11.4 G/DL (ref 12.5–16.5)
MCH RBC QN AUTO: 26.8 PG (ref 26–35)
MCHC RBC AUTO-ENTMCNC: 31.1 % (ref 32–34.5)
MCV RBC AUTO: 86.2 FL (ref 80–99.9)
PDW BLD-RTO: 14.2 FL (ref 11.5–15)
PLATELET # BLD: 495 E9/L (ref 130–450)
PMV BLD AUTO: 9 FL (ref 7–12)
POTASSIUM SERPL-SCNC: 3 MMOL/L (ref 3.5–5)
RBC # BLD: 4.26 E12/L (ref 3.8–5.8)
SMEAR, RESPIRATORY: NORMAL
SODIUM BLD-SCNC: 137 MMOL/L (ref 132–146)
WBC # BLD: 9.9 E9/L (ref 4.5–11.5)

## 2020-02-21 PROCEDURE — 2500000003 HC RX 250 WO HCPCS: Performed by: FAMILY MEDICINE

## 2020-02-21 PROCEDURE — 94640 AIRWAY INHALATION TREATMENT: CPT

## 2020-02-21 PROCEDURE — 2580000003 HC RX 258

## 2020-02-21 PROCEDURE — 94664 DEMO&/EVAL PT USE INHALER: CPT

## 2020-02-21 PROCEDURE — 80048 BASIC METABOLIC PNL TOTAL CA: CPT

## 2020-02-21 PROCEDURE — 6370000000 HC RX 637 (ALT 250 FOR IP): Performed by: INTERNAL MEDICINE

## 2020-02-21 PROCEDURE — 6360000002 HC RX W HCPCS: Performed by: FAMILY MEDICINE

## 2020-02-21 PROCEDURE — 99223 1ST HOSP IP/OBS HIGH 75: CPT | Performed by: INTERNAL MEDICINE

## 2020-02-21 PROCEDURE — 2060000000 HC ICU INTERMEDIATE R&B

## 2020-02-21 PROCEDURE — 85027 COMPLETE CBC AUTOMATED: CPT

## 2020-02-21 PROCEDURE — 36415 COLL VENOUS BLD VENIPUNCTURE: CPT

## 2020-02-21 PROCEDURE — 6370000000 HC RX 637 (ALT 250 FOR IP): Performed by: FAMILY MEDICINE

## 2020-02-21 PROCEDURE — 2580000003 HC RX 258: Performed by: FAMILY MEDICINE

## 2020-02-21 RX ORDER — PREDNISONE 20 MG/1
20 TABLET ORAL DAILY
Status: DISCONTINUED | OUTPATIENT
Start: 2020-02-27 | End: 2020-02-23 | Stop reason: HOSPADM

## 2020-02-21 RX ORDER — ASPIRIN 81 MG/1
81 TABLET, CHEWABLE ORAL DAILY
Status: DISCONTINUED | OUTPATIENT
Start: 2020-02-21 | End: 2020-02-23 | Stop reason: HOSPADM

## 2020-02-21 RX ORDER — PREDNISONE 10 MG/1
10 TABLET ORAL DAILY
Status: DISCONTINUED | OUTPATIENT
Start: 2020-03-01 | End: 2020-02-23 | Stop reason: HOSPADM

## 2020-02-21 RX ORDER — IPRATROPIUM BROMIDE AND ALBUTEROL SULFATE 2.5; .5 MG/3ML; MG/3ML
1 SOLUTION RESPIRATORY (INHALATION) 4 TIMES DAILY
Status: DISCONTINUED | OUTPATIENT
Start: 2020-02-21 | End: 2020-02-23 | Stop reason: HOSPADM

## 2020-02-21 RX ORDER — SODIUM CHLORIDE 0.9 % (FLUSH) 0.9 %
SYRINGE (ML) INJECTION
Status: COMPLETED
Start: 2020-02-21 | End: 2020-02-21

## 2020-02-21 RX ORDER — PREDNISONE 20 MG/1
40 TABLET ORAL DAILY
Status: COMPLETED | OUTPATIENT
Start: 2020-02-21 | End: 2020-02-23

## 2020-02-21 RX ORDER — PANTOPRAZOLE SODIUM 40 MG/1
40 GRANULE, DELAYED RELEASE ORAL
Status: DISCONTINUED | OUTPATIENT
Start: 2020-02-21 | End: 2020-02-23 | Stop reason: HOSPADM

## 2020-02-21 RX ORDER — PREDNISONE 1 MG/1
5 TABLET ORAL DAILY
Status: DISCONTINUED | OUTPATIENT
Start: 2020-02-21 | End: 2020-02-21 | Stop reason: SDUPTHER

## 2020-02-21 RX ORDER — POTASSIUM CHLORIDE 7.45 MG/ML
10 INJECTION INTRAVENOUS
Status: COMPLETED | OUTPATIENT
Start: 2020-02-21 | End: 2020-02-21

## 2020-02-21 RX ADMIN — POTASSIUM CHLORIDE 10 MEQ: 7.46 INJECTION, SOLUTION INTRAVENOUS at 14:46

## 2020-02-21 RX ADMIN — DOXYCYCLINE 100 MG: 100 INJECTION, POWDER, LYOPHILIZED, FOR SOLUTION INTRAVENOUS at 11:36

## 2020-02-21 RX ADMIN — IPRATROPIUM BROMIDE AND ALBUTEROL SULFATE 1 AMPULE: 2.5; .5 SOLUTION RESPIRATORY (INHALATION) at 21:19

## 2020-02-21 RX ADMIN — HYDROCODONE BITARTRATE AND ACETAMINOPHEN 1 TABLET: 10; 325 TABLET ORAL at 09:48

## 2020-02-21 RX ADMIN — HYDROCODONE BITARTRATE AND ACETAMINOPHEN 1 TABLET: 10; 325 TABLET ORAL at 19:01

## 2020-02-21 RX ADMIN — PREDNISONE 40 MG: 20 TABLET ORAL at 19:00

## 2020-02-21 RX ADMIN — CEFTRIAXONE SODIUM 1 G: 1 INJECTION, POWDER, FOR SOLUTION INTRAMUSCULAR; INTRAVENOUS at 11:36

## 2020-02-21 RX ADMIN — POTASSIUM CHLORIDE 10 MEQ: 7.46 INJECTION, SOLUTION INTRAVENOUS at 16:53

## 2020-02-21 RX ADMIN — ATORVASTATIN CALCIUM 40 MG: 40 TABLET, FILM COATED ORAL at 22:17

## 2020-02-21 RX ADMIN — GABAPENTIN 800 MG: 400 CAPSULE ORAL at 13:49

## 2020-02-21 RX ADMIN — CYCLOBENZAPRINE 10 MG: 10 TABLET, FILM COATED ORAL at 22:17

## 2020-02-21 RX ADMIN — ASPIRIN 81 MG 81 MG: 81 TABLET ORAL at 10:04

## 2020-02-21 RX ADMIN — LOSARTAN POTASSIUM 50 MG: 25 TABLET, FILM COATED ORAL at 09:48

## 2020-02-21 RX ADMIN — FAMOTIDINE 40 MG: 20 TABLET ORAL at 10:04

## 2020-02-21 RX ADMIN — POTASSIUM CHLORIDE 10 MEQ: 7.46 INJECTION, SOLUTION INTRAVENOUS at 15:45

## 2020-02-21 RX ADMIN — POTASSIUM CHLORIDE 10 MEQ: 7.46 INJECTION, SOLUTION INTRAVENOUS at 13:40

## 2020-02-21 RX ADMIN — METFORMIN HYDROCHLORIDE 1000 MG: 1000 TABLET ORAL at 09:49

## 2020-02-21 RX ADMIN — AMLODIPINE BESYLATE 10 MG: 10 TABLET ORAL at 09:49

## 2020-02-21 RX ADMIN — ROFLUMILAST 500 MCG: 500 TABLET ORAL at 09:48

## 2020-02-21 RX ADMIN — METFORMIN HYDROCHLORIDE 1000 MG: 1000 TABLET ORAL at 22:17

## 2020-02-21 RX ADMIN — HYDROCODONE BITARTRATE AND ACETAMINOPHEN 1 TABLET: 10; 325 TABLET ORAL at 03:38

## 2020-02-21 RX ADMIN — SODIUM CHLORIDE, PRESERVATIVE FREE 10 ML: 5 INJECTION INTRAVENOUS at 13:40

## 2020-02-21 RX ADMIN — DOXYCYCLINE 100 MG: 100 INJECTION, POWDER, LYOPHILIZED, FOR SOLUTION INTRAVENOUS at 00:30

## 2020-02-21 RX ADMIN — DULOXETINE HYDROCHLORIDE 30 MG: 30 CAPSULE, DELAYED RELEASE ORAL at 09:49

## 2020-02-21 RX ADMIN — GABAPENTIN 800 MG: 400 CAPSULE ORAL at 09:48

## 2020-02-21 RX ADMIN — GABAPENTIN 800 MG: 400 CAPSULE ORAL at 22:16

## 2020-02-21 RX ADMIN — SODIUM CHLORIDE, PRESERVATIVE FREE 10 ML: 5 INJECTION INTRAVENOUS at 11:36

## 2020-02-21 ASSESSMENT — PAIN DESCRIPTION - PAIN TYPE
TYPE: CHRONIC PAIN

## 2020-02-21 ASSESSMENT — PAIN SCALES - GENERAL
PAINLEVEL_OUTOF10: 6
PAINLEVEL_OUTOF10: 8
PAINLEVEL_OUTOF10: 8
PAINLEVEL_OUTOF10: 9
PAINLEVEL_OUTOF10: 6

## 2020-02-21 ASSESSMENT — PAIN DESCRIPTION - FREQUENCY
FREQUENCY: CONTINUOUS
FREQUENCY: INTERMITTENT

## 2020-02-21 ASSESSMENT — PAIN DESCRIPTION - ONSET
ONSET: ON-GOING
ONSET: ON-GOING

## 2020-02-21 ASSESSMENT — PAIN DESCRIPTION - DESCRIPTORS
DESCRIPTORS: ACHING;DISCOMFORT
DESCRIPTORS: ACHING;DISCOMFORT

## 2020-02-21 ASSESSMENT — PAIN DESCRIPTION - PROGRESSION
CLINICAL_PROGRESSION: NOT CHANGED
CLINICAL_PROGRESSION: NOT CHANGED

## 2020-02-21 ASSESSMENT — PAIN DESCRIPTION - LOCATION
LOCATION: THROAT
LOCATION: HEAD
LOCATION: HEAD

## 2020-02-21 NOTE — CONSULTS
[facility]  Department of Internal Medicine  Division of Pulmonary, Critical Care and Sleep Medicine  Consult Note    Ailin Saeed DO, [facility], [place], Livan Horton MD, [facility]    Patient: Sharyn Rollins. MRN: [id number]  : 1955    Encounter Time: 4:15 PM     Date of Admission: 2020 10:13 AM    Primary Care Physician: Valentin Hernández MD    Reason for Consultation: Perminent tracheotomy, H&N and Colon Cancer with pneumonia     HISTORY OF PRESENT ILLNESS : Sharyn Rollins. 77 y.o. male was seen in consultation regarding the above chief compliant. 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 He presents to the hospital and transferred by ambulance to [facility] for cough and shortness of breath. I guess of immediate importance is a squamous cell carcinoma of the larynx clinical stage T2 N0 diagnosed on laryngoscopy and biopsy on 2019 who underwent care at the [facility]. Due to hoarseness of breath in 2019 he underwent an evaluation was found to have right lower lobe opacities and a PET scan that was positive with a standard uptake value of 4.65 in the same area apparently he underwent a lung biopsy and in addition right epiglottic fold mass 9 mm was also biopsied and positive for cancer.   The biopsy of the lower lobe lung mass was reported to be negative laryngoscopy and biopsy Intake/Output Summary (Last 24 hours) at 2/21/2020 1615  Last data filed at 2/21/2020 1349  Gross per 24 hour   Intake 700 ml   Output 75 ml   Net 625 ml        CONSTITUTIONAL:   A&O x 3,   SKIN:     Skin discoloration, dirty, Nail bed +tobacco stains, dirt under nails  HEENT:     EOMI, MMM, No thrush,   NECK:    Trach old size is wiped off and I cant see it      No bruits, No JVP apprechiated  CV:      Sinus,  No murmur,   PULMONARY:   Couse BS,   No Rhonchi      No noted egophony  ABDOMEN:     Old incisions. Soft, non-tender. BS normal. No R/R/G  EXT:    No deformities . No clubbing. Trace lower extremity edema, No venous stasis  PULSE:   Appears equal and palpable.   PSYCHIATRIC:  No acute psycosis  MS:    No fractures, No gross weakness  NEUROLOGIC:   The clinical assessment is non-focal     DATA: IMAGING & TESTING:     LABORATORY TESTS:    CBC:   Lab Results   Component Value Date    WBC 9.9 02/21/2020    RBC 4.26 02/21/2020    HGB 11.4 02/21/2020    HCT 36.7 02/21/2020    MCV 86.2 02/21/2020    MCH 26.8 02/21/2020    MCHC 31.1 02/21/2020    RDW 14.2 02/21/2020     02/21/2020    MPV 9.0 02/21/2020     CMP:    Lab Results   Component Value Date     02/21/2020    K 3.0 02/21/2020    K 5.1 05/08/2019    CL 98 02/21/2020    CO2 27 02/21/2020    BUN 14 02/21/2020    CREATININE 0.8 02/21/2020    GFRAA >60 02/21/2020    LABGLOM >60 02/21/2020    GLUCOSE 151 02/21/2020    GLUCOSE 204 06/24/2011    PROT 7.2 02/19/2020    LABALBU 3.0 02/19/2020    CALCIUM 11.7 02/21/2020    BILITOT 0.3 02/19/2020    ALKPHOS 300 02/19/2020    AST 23 02/19/2020    ALT 25 02/19/2020        PRO-BNP: No results found for: PROBNP   ABGs:   Lab Results   Component Value Date    PH 7.463 02/19/2020    PO2 73.4 02/19/2020    PCO2 39.4 02/19/2020     Hemoglobin A1C: No components found for: HGBA1C    IMAGING:  Imaging tests were completed and reviewed and discussed radiology and care team involved and reveals   Ct Head Wo

## 2020-02-21 NOTE — PROGRESS NOTES
Nutrition Assessment    Type and Reason for Visit: Initial, Positive Nutrition Screen, Consult    Nutrition Recommendations: D/w patient bedside- unsure of what TF regimen he follows at home, however reports son is bringing in formula from home. Unsure of how much he receives of TF PTA however does report diarrhea when he uses PEG for nutrition. Will add PO Ensure to promote optimal PO intake. Recommending cyclic nightly TF of Semi-elemental @ 50ml/hr x 12 hrs (7p-7a) to provide additional 600ml TV, 720 kcals, 45 gm protein and provide ~50% of calculated protein needs. Will assess patient's tolerance to Semi-elemental formula and assess if patient can be switched to a standard formula. Will continue to monitor and follow patient. Nutrition Assessment: Patient admit w/ severe malnutrition w/ noted muscle/fat wasting. Noted hx of throat ca s/p trach/PEG. Pt consumes PO +TF . will add PO ONS and also reccomend nightly TF recs     Malnutrition Assessment:  · Malnutrition Status: Meets the criteria for severe malnutrition  · Context: Acute illness or injury  · Findings of the 6 clinical characteristics of malnutrition (Minimum of 2 out of 6 clinical characteristics is required to make the diagnosis of moderate or severe Protein Calorie Malnutrition based on AND/ASPEN Guidelines):  1. Energy Intake-Unable to assess(pt reports PO intake \"good\" PTA ),      2. Weight Loss-20% loss or greater(31.5% ), in 1 year  3. Fat Loss-Moderate subcutaneous fat loss, Orbital, Triceps  4. Muscle Loss-Severe muscle mass loss, Temples (temporalis muscle), Clavicles (pectoralis and deltoids)  5. Fluid Accumulation-No significant fluid accumulation,    6.   Strength-Not measured    Nutrition Risk Level: High    Nutrient Needs:  · Estimated Daily Total Kcal: 5266-3391(MSJ: 1463 x 1.2 )  · Estimated Daily Protein (g): (1.5-1.8)  · Estimated Daily Total Fluid (ml/day): 1700ml     Nutrition Diagnosis:   · Problem: Severe malnutrition, In context of chronic illness  · Etiology: related to Catabolic illness(hx of throat Ca s/p trach/PEG )     Signs and symptoms:  as evidenced by Weight loss greater than or equal to 20% in 1 year, Moderate loss of subcutaneous fat, Severe muscle loss    Objective Information:  · Nutrition-Focused Physical Findings: -I/Os, trach/PEG, +1 BLE edema noted, active bs   · Wound Type: None  · Current Nutrition Therapies:  · Oral Diet Orders: Carb Control 4 Carbs/Meal   · Oral Nutrition Supplement (ONS) Orders: None  · Anthropometric Measures:  · Ht: 6' (182.9 cm)   · Current Body Wt: 142 lb (64.4 kg)(2/21 actual bedscale )  · Admission Body Wt: 142 lb (64.4 kg)(2/20 actual )  · Usual Body Wt: 207 lb (93.9 kg)(3/2019 actual per EMR )  · % Weight Change:  ,  31.5% wt loss noted x 1 year per EMR   · Ideal Body Wt: 178 lb (80.7 kg), % Ideal Body 79%   · BMI Classification: BMI 18.5 - 24.9 Normal Weight    Nutrition Interventions:   Continue current diet, Start ONS, Start Tube Feeding  Continued Inpatient Monitoring, Coordination of Care, Education Initiated(D/w pt and RN bedside POC )    Nutrition Evaluation:   · Evaluation: Goals set   · Goals: Consume 75% or more of most meals/ONS; TF w/ good tolerance     · Monitoring: Meal Intake, Supplement Intake, TF Intake, TF Tolerance, Skin Integrity, Wound Healing, I&O, Mental Status/Confusion, Weight, Pertinent Labs, Chewing/Swallowing, Monitor Bowel Function      Electronically signed by Mickey Burr RD, LD on 2/21/20 at 1:58 PM    Contact Number: x [phone]

## 2020-02-21 NOTE — PLAN OF CARE
Problem: Malnutrition  (NI-5.2)  Goal: Food and/or Nutrient Delivery  Description TF + PO diet   Individualized approach for food/nutrient provision.   Outcome: Met This Shift

## 2020-02-22 LAB
ANION GAP SERPL CALCULATED.3IONS-SCNC: 12 MMOL/L (ref 7–16)
BUN BLDV-MCNC: 17 MG/DL (ref 8–23)
CALCIUM SERPL-MCNC: 11.4 MG/DL (ref 8.6–10.2)
CHLORIDE BLD-SCNC: 92 MMOL/L (ref 98–107)
CO2: 27 MMOL/L (ref 22–29)
CREAT SERPL-MCNC: 0.6 MG/DL (ref 0.7–1.2)
GFR AFRICAN AMERICAN: >60
GFR NON-AFRICAN AMERICAN: >60 ML/MIN/1.73
GLUCOSE BLD-MCNC: 212 MG/DL (ref 74–99)
HCT VFR BLD CALC: 35.9 % (ref 37–54)
HEMOGLOBIN: 11.1 G/DL (ref 12.5–16.5)
MCH RBC QN AUTO: 26.6 PG (ref 26–35)
MCHC RBC AUTO-ENTMCNC: 30.9 % (ref 32–34.5)
MCV RBC AUTO: 86.1 FL (ref 80–99.9)
METER GLUCOSE: 292 MG/DL (ref 74–99)
PDW BLD-RTO: 14.1 FL (ref 11.5–15)
PLATELET # BLD: 455 E9/L (ref 130–450)
PMV BLD AUTO: 9.2 FL (ref 7–12)
POTASSIUM SERPL-SCNC: 4.6 MMOL/L (ref 3.5–5)
RBC # BLD: 4.17 E12/L (ref 3.8–5.8)
SODIUM BLD-SCNC: 131 MMOL/L (ref 132–146)
WBC # BLD: 11.8 E9/L (ref 4.5–11.5)

## 2020-02-22 PROCEDURE — 99232 SBSQ HOSP IP/OBS MODERATE 35: CPT | Performed by: INTERNAL MEDICINE

## 2020-02-22 PROCEDURE — 2580000003 HC RX 258

## 2020-02-22 PROCEDURE — 6370000000 HC RX 637 (ALT 250 FOR IP): Performed by: INTERNAL MEDICINE

## 2020-02-22 PROCEDURE — 85027 COMPLETE CBC AUTOMATED: CPT

## 2020-02-22 PROCEDURE — 2060000000 HC ICU INTERMEDIATE R&B

## 2020-02-22 PROCEDURE — 6370000000 HC RX 637 (ALT 250 FOR IP): Performed by: FAMILY MEDICINE

## 2020-02-22 PROCEDURE — 82962 GLUCOSE BLOOD TEST: CPT

## 2020-02-22 PROCEDURE — 2580000003 HC RX 258: Performed by: FAMILY MEDICINE

## 2020-02-22 PROCEDURE — 80048 BASIC METABOLIC PNL TOTAL CA: CPT

## 2020-02-22 PROCEDURE — 36415 COLL VENOUS BLD VENIPUNCTURE: CPT

## 2020-02-22 PROCEDURE — 6360000002 HC RX W HCPCS: Performed by: FAMILY MEDICINE

## 2020-02-22 PROCEDURE — 2500000003 HC RX 250 WO HCPCS: Performed by: FAMILY MEDICINE

## 2020-02-22 PROCEDURE — 94640 AIRWAY INHALATION TREATMENT: CPT

## 2020-02-22 RX ORDER — LEVOFLOXACIN 500 MG/1
500 TABLET, FILM COATED ORAL DAILY
Qty: 10 TABLET | Refills: 0 | Status: SHIPPED | OUTPATIENT
Start: 2020-02-22 | End: 2020-03-03

## 2020-02-22 RX ORDER — METHYLPREDNISOLONE 4 MG/1
TABLET ORAL
Qty: 1 KIT | Refills: 0 | Status: SHIPPED | OUTPATIENT
Start: 2020-02-22 | End: 2020-02-28

## 2020-02-22 RX ORDER — SODIUM CHLORIDE 0.9 % (FLUSH) 0.9 %
SYRINGE (ML) INJECTION
Status: COMPLETED
Start: 2020-02-22 | End: 2020-02-22

## 2020-02-22 RX ADMIN — HYDROCODONE BITARTRATE AND ACETAMINOPHEN 1 TABLET: 10; 325 TABLET ORAL at 07:49

## 2020-02-22 RX ADMIN — SODIUM CHLORIDE, PRESERVATIVE FREE 10 ML: 5 INJECTION INTRAVENOUS at 11:03

## 2020-02-22 RX ADMIN — GABAPENTIN 800 MG: 400 CAPSULE ORAL at 21:23

## 2020-02-22 RX ADMIN — GABAPENTIN 800 MG: 400 CAPSULE ORAL at 15:06

## 2020-02-22 RX ADMIN — Medication 5 ML: at 02:18

## 2020-02-22 RX ADMIN — HYDROCODONE BITARTRATE AND ACETAMINOPHEN 1 TABLET: 10; 325 TABLET ORAL at 01:16

## 2020-02-22 RX ADMIN — DULOXETINE HYDROCHLORIDE 30 MG: 30 CAPSULE, DELAYED RELEASE ORAL at 09:29

## 2020-02-22 RX ADMIN — PREDNISONE 40 MG: 20 TABLET ORAL at 09:29

## 2020-02-22 RX ADMIN — METFORMIN HYDROCHLORIDE 1000 MG: 1000 TABLET ORAL at 21:22

## 2020-02-22 RX ADMIN — DOXYCYCLINE 100 MG: 100 INJECTION, POWDER, LYOPHILIZED, FOR SOLUTION INTRAVENOUS at 02:19

## 2020-02-22 RX ADMIN — ATORVASTATIN CALCIUM 40 MG: 40 TABLET, FILM COATED ORAL at 21:23

## 2020-02-22 RX ADMIN — AMLODIPINE BESYLATE 10 MG: 10 TABLET ORAL at 09:30

## 2020-02-22 RX ADMIN — ASPIRIN 81 MG 81 MG: 81 TABLET ORAL at 09:29

## 2020-02-22 RX ADMIN — PANTOPRAZOLE SODIUM 40 MG: 40 GRANULE, DELAYED RELEASE ORAL at 06:22

## 2020-02-22 RX ADMIN — HYDROCODONE BITARTRATE AND ACETAMINOPHEN 1 TABLET: 10; 325 TABLET ORAL at 15:06

## 2020-02-22 RX ADMIN — GABAPENTIN 800 MG: 400 CAPSULE ORAL at 09:29

## 2020-02-22 RX ADMIN — IPRATROPIUM BROMIDE AND ALBUTEROL SULFATE 1 AMPULE: 2.5; .5 SOLUTION RESPIRATORY (INHALATION) at 13:57

## 2020-02-22 RX ADMIN — DIPHENHYDRAMINE HCL 25 MG: 25 TABLET ORAL at 21:16

## 2020-02-22 RX ADMIN — IPRATROPIUM BROMIDE AND ALBUTEROL SULFATE 1 AMPULE: 2.5; .5 SOLUTION RESPIRATORY (INHALATION) at 18:49

## 2020-02-22 RX ADMIN — METFORMIN HYDROCHLORIDE 1000 MG: 1000 TABLET ORAL at 09:30

## 2020-02-22 RX ADMIN — CEFTRIAXONE SODIUM 1 G: 1 INJECTION, POWDER, FOR SOLUTION INTRAMUSCULAR; INTRAVENOUS at 11:03

## 2020-02-22 RX ADMIN — DOXYCYCLINE 100 MG: 100 INJECTION, POWDER, LYOPHILIZED, FOR SOLUTION INTRAVENOUS at 12:06

## 2020-02-22 RX ADMIN — LOSARTAN POTASSIUM 50 MG: 25 TABLET, FILM COATED ORAL at 09:29

## 2020-02-22 RX ADMIN — ROFLUMILAST 500 MCG: 500 TABLET ORAL at 09:29

## 2020-02-22 RX ADMIN — DOXYCYCLINE 100 MG: 100 INJECTION, POWDER, LYOPHILIZED, FOR SOLUTION INTRAVENOUS at 23:52

## 2020-02-22 RX ADMIN — IPRATROPIUM BROMIDE AND ALBUTEROL SULFATE 1 AMPULE: 2.5; .5 SOLUTION RESPIRATORY (INHALATION) at 22:35

## 2020-02-22 RX ADMIN — HYDROCODONE BITARTRATE AND ACETAMINOPHEN 1 TABLET: 10; 325 TABLET ORAL at 21:20

## 2020-02-22 RX ADMIN — IPRATROPIUM BROMIDE AND ALBUTEROL SULFATE 1 AMPULE: 2.5; .5 SOLUTION RESPIRATORY (INHALATION) at 09:03

## 2020-02-22 RX ADMIN — DIPHENHYDRAMINE HCL 25 MG: 25 TABLET ORAL at 15:54

## 2020-02-22 ASSESSMENT — PAIN SCALES - GENERAL
PAINLEVEL_OUTOF10: 4
PAINLEVEL_OUTOF10: 3
PAINLEVEL_OUTOF10: 0
PAINLEVEL_OUTOF10: 9
PAINLEVEL_OUTOF10: 4
PAINLEVEL_OUTOF10: 8
PAINLEVEL_OUTOF10: 7
PAINLEVEL_OUTOF10: 9

## 2020-02-22 ASSESSMENT — PAIN DESCRIPTION - PAIN TYPE
TYPE: CHRONIC PAIN
TYPE: CHRONIC PAIN

## 2020-02-22 ASSESSMENT — PAIN DESCRIPTION - ONSET
ONSET: ON-GOING
ONSET: ON-GOING

## 2020-02-22 ASSESSMENT — PAIN DESCRIPTION - PROGRESSION
CLINICAL_PROGRESSION: NOT CHANGED
CLINICAL_PROGRESSION: NOT CHANGED

## 2020-02-22 ASSESSMENT — PAIN DESCRIPTION - FREQUENCY
FREQUENCY: INTERMITTENT
FREQUENCY: INTERMITTENT

## 2020-02-22 ASSESSMENT — PAIN DESCRIPTION - DESCRIPTORS
DESCRIPTORS: DISCOMFORT
DESCRIPTORS: ACHING;SORE

## 2020-02-22 ASSESSMENT — PAIN DESCRIPTION - LOCATION
LOCATION: NECK
LOCATION: NECK

## 2020-02-22 NOTE — PROGRESS NOTES
[place]  Department of Internal Medicine  Division of Pulmonary, Critical Care and Sleep Medicine  Consult Note    Ronel Rojo DO, [place], [place], Sean Tenorio MD, [place]    Patient: Flaca Foster. MRN: [id number]  : 1955    Encounter Time: 3:53 PM     Date of Admission: 2020 10:13 AM    Primary Care Physician: Kamille Post MD    Reason for Consultation: Perminent tracheotomy, H&N and Colon Cancer with pneumonia     SUBJECTIVE:    Better   No fevers  Trach was placed in [address] by Dr. Pepito Sauceda ENT need those records      HISTORY OF PRESENT ILLNESS : Flaca Foster. 77 y.o. male was seen in consultation regarding the above chief compliant. 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 He presents to the hospital and transferred by ambulance to [facility] for cough and shortness of breath. 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   Due to hoarseness of breath in 2019 he underwent an evaluation was found to have right lower lobe opacities and a PET scan that was positive with a standard uptake value of 4.65 in the same area apparently he underwent a lung biopsy and in addition right epiglottic fold mass 9 mm was also biopsied and positive ([facility] [address] Depression, Diabetes mellitus ([facility] [address] GERD (gastroesophageal reflux disease), Headache(784.0), Hyperlipidemia, Hypertension, Migraines, Neck pain, chronic, Numbness and tingling, Osteoarthritis, Type II or unspecified type diabetes mellitus without mention of complication, not stated as uncontrolled, Unspecified sleep apnea, and Vitamin D deficiency. SURGICAL HISTORY:  has a past surgical history that includes back surgery (37 yrs ago); other surgical history (2/15/12); Rotator cuff repair; lipoma resection; rhinoplasty; other surgical history (N/A, 1/29/2014); Colonoscopy; Upper gastrointestinal endoscopy; and Small intestine surgery (Right, 5/7/2019). SOCIAL HISTORY:  reports that he has quit smoking. His smoking use included cigarettes and cigars. He has a 47.00 pack-year smoking history. He has never used smokeless tobacco. He reports that he does not drink alcohol or use drugs. FAMILY  HISTORY: family history includes Cancer in his mother; Coronary Art Dis in his father and mother; Diabetes in his maternal grandmother; Heart Attack in his father; Other in his father and mother. MEDICATIONS:    Prior to Admission medications    Medication Sig Start Date End Date Taking? Authorizing Provider   cyclobenzaprine (FLEXERIL) 10 MG tablet Take 10 mg by mouth 3 times daily as needed for Muscle spasms   Yes Historical Provider, MD   DULoxetine (CYMBALTA) 30 MG extended release capsule Take 30 mg by mouth daily   Yes Historical Provider, MD   Magic Mouthwash (MIRACLE MOUTHWASH) Swish and spit 5 mLs 4 times daily as needed for Irritation   Yes Historical Provider, MD   Roflumilast (DALIRESP) 500 MCG tablet Take 500 mcg by mouth daily   Yes Historical Provider, MD   HYDROcodone-acetaminophen (NORCO)  MG per tablet Take 1 tablet by mouth every 6 hours as needed for Pain. .   Yes Historical Provider, MD   losartan (COZAAR) 50 MG tablet Take 50 mg by mouth daily   Yes Historical Provider, MD nodes.  Immunologic:  No hives or hx of anaphaxsis. OBJECTIVE:     PHYSICAL EXAM:   VITALS:     Intake/Output Summary (Last 24 hours) at 2/22/2020 1553  Last data filed at 2/22/2020 1500  Gross per 24 hour   Intake 848 ml   Output 0 ml   Net 848 ml        CONSTITUTIONAL:   A&O x 3,   SKIN:     Skin discoloration, dirty, Nail bed +tobacco stains, dirt under nails  HEENT:     EOMI, MMM, No thrush,   NECK:    Trach old size is wiped off and I cant see it      No bruits, No JVP apprechiated  CV:      Sinus,  No murmur,   PULMONARY:   Couse BS,   No Rhonchi      No noted egophony  ABDOMEN:     Old incisions. Soft, non-tender. BS normal. No R/R/G  EXT:    No deformities . No clubbing. Trace lower extremity edema, No venous stasis  PULSE:   Appears equal and palpable.   PSYCHIATRIC:  No acute psycosis  MS:    No fractures, No gross weakness  NEUROLOGIC:   The clinical assessment is non-focal     DATA: IMAGING & TESTING:     LABORATORY TESTS:    CBC:   Lab Results   Component Value Date    WBC 11.8 02/22/2020    RBC 4.17 02/22/2020    HGB 11.1 02/22/2020    HCT 35.9 02/22/2020    MCV 86.1 02/22/2020    MCH 26.6 02/22/2020    MCHC 30.9 02/22/2020    RDW 14.1 02/22/2020     02/22/2020    MPV 9.2 02/22/2020     CMP:    Lab Results   Component Value Date     02/22/2020    K 4.6 02/22/2020    K 5.1 05/08/2019    CL 92 02/22/2020    CO2 27 02/22/2020    BUN 17 02/22/2020    CREATININE 0.6 02/22/2020    GFRAA >60 02/22/2020    LABGLOM >60 02/22/2020    GLUCOSE 212 02/22/2020    GLUCOSE 204 06/24/2011    PROT 7.2 02/19/2020    LABALBU 3.0 02/19/2020    CALCIUM 11.4 02/22/2020    BILITOT 0.3 02/19/2020    ALKPHOS 300 02/19/2020    AST 23 02/19/2020    ALT 25 02/19/2020        PRO-BNP: No results found for: PROBNP   ABGs:   Lab Results   Component Value Date    PH 7.463 02/19/2020    PO2 73.4 02/19/2020    PCO2 39.4 02/19/2020     Hemoglobin A1C: No components found for: HGBA1C    IMAGING:  Imaging tests were completed and reviewed and discussed radiology and care team involved and reveals   Ct Head Wo Contrast  Result Date: 2020   FINDINGS: No mass, hemorrhage or midline shift. Ventricles and sulci normal. Bony calvarium unremarkable. Xr Chest Portable  Result Date: 2020  Patient MRN:  [id number] : 1955 Age: 77 years Gender: Male Order Date:  2020 10:30 AM EXAM: XR CHEST PORTABLE One image INDICATION:  weakness, Possible Stroke weakness, Possible Stroke COMPARISON: Previous CT scan 2/15/2012 FINDINGS: There is cardiomegaly. Tracheostomy tube is noted. There is vascular congestion with the right basilar atelectasis and infiltrates in the left lung base. There is underlying COPD. Focal masslike densities are identified in the left midlung field and right perihilar region measuring up to 2.3 cm. Old left clavicle fracture is noted. CHF superimposed on COPD with left basilar infiltrates. Focal nodular masslike densities in the lungs bilaterally, indeterminate for developing malignancy. Assessment: 57-year-old gentleman with head and neck cancer with multiple pulmonary nodules well-established followed by oncology who presents with hypoglycemia likely secondary to poor nutrition, sulfonylureas, and narcotic pain medication. 1. Possible pneumonia if not chronic tracheotomy leading to chronic tracheitis  2. COPD  3. Active smoker  4. Peripheral vascular disease        Plan:   1. Continue the current antibiotics for 5 to 7 days  2. Steroid taper oral started  3. Need records on trach from [facility]    4. Bronchodilators  5. He can follow-up with his routine team in [place] on discharge  6.  Ok to discharge Ellie Reis MPH, Reta Vines  Professor of Medicine  Pulmonary, Critical Care and Sleep Medicine

## 2020-02-22 NOTE — PROGRESS NOTES
cyclobenzaprine (FLEXERIL) tablet 10 mg  10 mg Oral TID PRN Sommer Carrillo MD   10 mg at 02/21/20 2217    DULoxetine (CYMBALTA) extended release capsule 30 mg  30 mg Oral Daily Sommer Carrillo MD   30 mg at 02/21/20 8242    magic (miracle) mouthwash  5 mL Swish & Spit 4x Daily PRN Sommer Carrillo MD   5 mL at 02/22/20 0218    doxycycline (VIBRAMYCIN) 100 mg in dextrose 5 % 100 mL IVPB  100 mg Intravenous Q12H Sommer Carrillo MD   Stopped at 02/22/20 0319    cefTRIAXone (ROCEPHIN) 1 g in sterile water 10 mL IV syringe  1 g Intravenous Q24H Sommer Carrillo MD   1 g at 02/21/20 1136    enoxaparin (LOVENOX) injection 40 mg  40 mg Subcutaneous Daily Sommer Carrillo MD         Allergies   Allergen Reactions    Benzonatate Hives    Procaine Hcl Hives    Roxicet [Oxycodone-Acetaminophen] Hives    Dye [Barium-Containing Compounds]      CT dye causes hives and itching     Active Problems:    AMS (altered mental status)    Severe protein-calorie malnutrition ([facility] [address]  Resolved Problems:    * No resolved hospital problems. *    Blood pressure 130/70, pulse 94, temperature 97.9 °F (36.6 °C), temperature source Temporal, resp. rate 18, height 6' (1.829 m), weight 142 lb (64.4 kg), SpO2 93 %. Subjective:  Symptoms:  Improved. Diet:  Adequate intake. Activity level: Normal.    Pain:  He reports no pain. Objective:  General Appearance:  Comfortable. Vital signs: (most recent): Blood pressure 130/70, pulse 94, temperature 97.9 °F (36.6 °C), temperature source Temporal, resp. rate 18, height 6' (1.829 m), weight 142 lb (64.4 kg), SpO2 93 %. No fever. Lungs:  Normal effort and normal respiratory rate. He is not in respiratory distress. Heart: Normal rate. Regular rhythm.   S1 normal and S2 normal.      Assessment:  (Altered mental status  Acute metabolic encephalopathy  Pneumonia unspecified  Severe malnutrition- dietary following  Head and neck cancer  Hypoglycemia  HTN  Hyperlipidemia  COPD     Plan:  Doxy and rocephin as procalcitnin high  Monitor cultures and labs. Pulmonary following. Continue rest of meds. Social work looking into placement, though patient is against it. Sees oncology outpatient. ).        Keesha Zurita MD  2/22/2020

## 2020-02-23 VITALS
OXYGEN SATURATION: 93 % | HEART RATE: 79 BPM | SYSTOLIC BLOOD PRESSURE: 133 MMHG | HEIGHT: 72 IN | DIASTOLIC BLOOD PRESSURE: 62 MMHG | BODY MASS INDEX: 19.23 KG/M2 | WEIGHT: 142 LBS | TEMPERATURE: 97 F | RESPIRATION RATE: 16 BRPM

## 2020-02-23 LAB
ANION GAP SERPL CALCULATED.3IONS-SCNC: 12 MMOL/L (ref 7–16)
BUN BLDV-MCNC: 18 MG/DL (ref 8–23)
CALCIUM SERPL-MCNC: 11.2 MG/DL (ref 8.6–10.2)
CHLORIDE BLD-SCNC: 96 MMOL/L (ref 98–107)
CO2: 28 MMOL/L (ref 22–29)
CREAT SERPL-MCNC: 0.6 MG/DL (ref 0.7–1.2)
GFR AFRICAN AMERICAN: >60
GFR NON-AFRICAN AMERICAN: >60 ML/MIN/1.73
GLUCOSE BLD-MCNC: 111 MG/DL (ref 74–99)
HCT VFR BLD CALC: 35.5 % (ref 37–54)
HEMOGLOBIN: 11.3 G/DL (ref 12.5–16.5)
MCH RBC QN AUTO: 27.2 PG (ref 26–35)
MCHC RBC AUTO-ENTMCNC: 31.8 % (ref 32–34.5)
MCV RBC AUTO: 85.5 FL (ref 80–99.9)
PDW BLD-RTO: 13.9 FL (ref 11.5–15)
PLATELET # BLD: 490 E9/L (ref 130–450)
PMV BLD AUTO: 9.1 FL (ref 7–12)
POTASSIUM SERPL-SCNC: 3.8 MMOL/L (ref 3.5–5)
RBC # BLD: 4.15 E12/L (ref 3.8–5.8)
SODIUM BLD-SCNC: 136 MMOL/L (ref 132–146)
WBC # BLD: 14.4 E9/L (ref 4.5–11.5)

## 2020-02-23 PROCEDURE — 6370000000 HC RX 637 (ALT 250 FOR IP): Performed by: FAMILY MEDICINE

## 2020-02-23 PROCEDURE — 99232 SBSQ HOSP IP/OBS MODERATE 35: CPT | Performed by: INTERNAL MEDICINE

## 2020-02-23 PROCEDURE — 80048 BASIC METABOLIC PNL TOTAL CA: CPT

## 2020-02-23 PROCEDURE — 6360000002 HC RX W HCPCS: Performed by: FAMILY MEDICINE

## 2020-02-23 PROCEDURE — 6370000000 HC RX 637 (ALT 250 FOR IP): Performed by: INTERNAL MEDICINE

## 2020-02-23 PROCEDURE — 36415 COLL VENOUS BLD VENIPUNCTURE: CPT

## 2020-02-23 PROCEDURE — 85027 COMPLETE CBC AUTOMATED: CPT

## 2020-02-23 PROCEDURE — 94640 AIRWAY INHALATION TREATMENT: CPT

## 2020-02-23 RX ORDER — IPRATROPIUM BROMIDE AND ALBUTEROL SULFATE 2.5; .5 MG/3ML; MG/3ML
3 SOLUTION RESPIRATORY (INHALATION) 4 TIMES DAILY
Qty: 360 ML | Refills: 1 | Status: SHIPPED | OUTPATIENT
Start: 2020-02-23

## 2020-02-23 RX ORDER — ASPIRIN 81 MG/1
81 TABLET, CHEWABLE ORAL DAILY
Qty: 30 TABLET | Refills: 3 | Status: SHIPPED | OUTPATIENT
Start: 2020-02-23

## 2020-02-23 RX ORDER — AMLODIPINE BESYLATE 10 MG/1
10 TABLET ORAL DAILY
Qty: 30 TABLET | Refills: 3 | Status: SHIPPED | OUTPATIENT
Start: 2020-02-23

## 2020-02-23 RX ADMIN — ASPIRIN 81 MG 81 MG: 81 TABLET ORAL at 08:51

## 2020-02-23 RX ADMIN — IPRATROPIUM BROMIDE AND ALBUTEROL SULFATE 1 AMPULE: 2.5; .5 SOLUTION RESPIRATORY (INHALATION) at 09:33

## 2020-02-23 RX ADMIN — AMLODIPINE BESYLATE 10 MG: 10 TABLET ORAL at 08:51

## 2020-02-23 RX ADMIN — PANTOPRAZOLE SODIUM 40 MG: 40 GRANULE, DELAYED RELEASE ORAL at 08:52

## 2020-02-23 RX ADMIN — GABAPENTIN 800 MG: 400 CAPSULE ORAL at 08:51

## 2020-02-23 RX ADMIN — ROFLUMILAST 500 MCG: 500 TABLET ORAL at 08:52

## 2020-02-23 RX ADMIN — HYDROCODONE BITARTRATE AND ACETAMINOPHEN 1 TABLET: 10; 325 TABLET ORAL at 06:42

## 2020-02-23 RX ADMIN — LOSARTAN POTASSIUM 50 MG: 25 TABLET, FILM COATED ORAL at 08:51

## 2020-02-23 RX ADMIN — PREDNISONE 40 MG: 20 TABLET ORAL at 08:51

## 2020-02-23 RX ADMIN — DULOXETINE HYDROCHLORIDE 30 MG: 30 CAPSULE, DELAYED RELEASE ORAL at 08:51

## 2020-02-23 RX ADMIN — METFORMIN HYDROCHLORIDE 1000 MG: 1000 TABLET ORAL at 08:51

## 2020-02-23 RX ADMIN — HYDROCODONE BITARTRATE AND ACETAMINOPHEN 1 TABLET: 10; 325 TABLET ORAL at 13:12

## 2020-02-23 ASSESSMENT — PAIN SCALES - GENERAL
PAINLEVEL_OUTOF10: 8
PAINLEVEL_OUTOF10: 9

## 2020-02-23 NOTE — PROGRESS NOTES
[place]  Department of Internal Medicine  Division of Pulmonary, Critical Care and Sleep Medicine  Consult Note    Kirill Wood DO, [facility], Coleen Umanzor MD, [facility]    Patient: Tisha Blank. MRN: [id number]  : 1955    Encounter Time: 10:39 AM     Date of Admission: 2020 10:13 AM    Primary Care Physician: Linette Goodwin MD    Reason for Consultation: Perminent tracheotomy, H&N and Colon Cancer with pneumonia     SUBJECTIVE:    Better   No fevers  Trach was placed in [facility] by Dr. Lito Klein ENT need those records      HISTORY OF PRESENT ILLNESS : Tisha Blank. 77 y.o. male was seen in consultation regarding the above chief compliant. 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 He presents to the hospital and transferred by ambulance to [facility] for cough and shortness of breath. 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   Due to hoarseness of breath in 2019 he underwent an evaluation was found to have right lower lobe opacities and a PET scan that was positive with a standard uptake value of 4.65 in the same area apparently he underwent a lung biopsy and in addition right epiglottic fold mass 9 mm was also biopsied and disease) ([facility] [address] Depression, Diabetes mellitus ([facility] [address] GERD (gastroesophageal reflux disease), Headache(784.0), Hyperlipidemia, Hypertension, Migraines, Neck pain, chronic, Numbness and tingling, Osteoarthritis, Type II or unspecified type diabetes mellitus without mention of complication, not stated as uncontrolled, Unspecified sleep apnea, and Vitamin D deficiency. SURGICAL HISTORY:  has a past surgical history that includes back surgery (37 yrs ago); other surgical history (2/15/12); Rotator cuff repair; lipoma resection; rhinoplasty; other surgical history (N/A, 1/29/2014); Colonoscopy; Upper gastrointestinal endoscopy; and Small intestine surgery (Right, 5/7/2019). SOCIAL HISTORY:  reports that he has quit smoking. His smoking use included cigarettes and cigars. He has a 47.00 pack-year smoking history. He has never used smokeless tobacco. He reports that he does not drink alcohol or use drugs. FAMILY  HISTORY: family history includes Cancer in his mother; Coronary Art Dis in his father and mother; Diabetes in his maternal grandmother; Heart Attack in his father; Other in his father and mother. MEDICATIONS:    Prior to Admission medications    Medication Sig Start Date End Date Taking? Authorizing Provider   aspirin 81 MG chewable tablet Take 1 tablet by mouth daily 2/23/20  Yes Charlie Billy MD   ipratropium-albuterol (DUONEB) 0.5-2.5 (3) MG/3ML SOLN nebulizer solution Inhale 3 mLs into the lungs 4 times daily 2/23/20  Yes Charlie Billy MD   metFORMIN (GLUCOPHAGE) 1000 MG tablet Take 1 tablet by mouth 2 times daily Indications: ran out of feb 2019 2/23/20  Yes Charlie Billy MD   amLODIPine (NORVASC) 10 MG tablet Take 1 tablet by mouth daily 2/23/20  Yes Charlie Billy MD   Respiratory Therapy Supplies (FULL KIT NEBULIZER SET) MISC Use as directed with nebulized medication.  2/23/20  Yes Charlie Billy MD   levoFLOXacin (LEVAQUIN) 500 MG tablet Take 1 tablet by mouth daily for 10 days 2/22/20 3/3/20 Yes Colleen Chavarria, DO   methylPREDNISolone (MEDROL, KT,) 4 MG tablet Take as directed 2/22/20 2/28/20 Yes Neno Sanchez, DO   cyclobenzaprine (FLEXERIL) 10 MG tablet Take 10 mg by mouth 3 times daily as needed for Muscle spasms   Yes Historical Provider, MD   DULoxetine (CYMBALTA) 30 MG extended release capsule Take 30 mg by mouth daily   Yes Historical Provider, MD   Magic Mouthwash (MIRACLE MOUTHWASH) Swish and spit 5 mLs 4 times daily as needed for Irritation   Yes Historical Provider, MD   Roflumilast (DALIRESP) 500 MCG tablet Take 500 mcg by mouth daily   Yes Historical Provider, MD   HYDROcodone-acetaminophen (NORCO)  MG per tablet Take 1 tablet by mouth every 6 hours as needed for Pain. .   Yes Historical Provider, MD   losartan (COZAAR) 50 MG tablet Take 50 mg by mouth daily   Yes Historical Provider, MD   pantoprazole (PROTONIX) 40 MG tablet Take 40 mg by mouth every morning    Yes Historical Provider, MD   ranitidine (ZANTAC) 300 MG capsule Take 300 mg by mouth every evening    Yes Historical Provider, MD   diphenhydrAMINE (BENADRYL) 25 MG tablet Take 25 mg by mouth daily as needed for Itching    Yes Historical Provider, MD   glipiZIDE (GLUCOTROL) 10 MG tablet Take 10 mg by mouth 2 times daily. Yes Historical Provider, MD   atorvastatin (LIPITOR) 40 MG tablet Take 40 mg by mouth nightly. Yes Historical Provider, MD   gabapentin (NEURONTIN) 800 MG tablet   Take 800 mg by mouth 3 times daily    Yes Historical Provider, MD   OXYGEN Inhale into the lungs Use as needed-  Mostly at night if needed, 31/2 liters    Historical Provider, MD       ALLERGIES: Benzonatate; Procaine hcl; Roxicet [oxycodone-acetaminophen]; and Dye [barium-containing compounds]       REVIEW OF SYSTEMS:  Otherwise negative if not reported or listed below  Constitutional:  + unanticipated weight loss. No change in sleep pattern or activity.       + fevers, chills or rigors. Eyes:   No visual changes or diplopia. No scleral icterus. ENT:    No Headaches, hearing loss or vertigo. No mouth sores or sore throat. Cardiovascular:  + chest discomfort, palpitations. Respiratory:  + cough, No wheezing      + sputum production. No hemoptysis, pleuritic pain. Gastrointestinal:  No abdominal pain, appetite loss, blood in stools. No hematemesis  Genitourinary:  No dysuria, trouble voiding or hematuria. No nocturia. Musculoskeletal:   + weakness or joint complaints. Integumentary: No rashes or pruritis. Neurological:  No headache, numbness or tingling. Psychiatric:   No effect on mood, memory, mentation, or  behavior. + anxiety or depression. Endocrine:   No excessive thirst, fluid intake, or urination. No tremor. Hematologic: No abnormal bruising or bleeding. Lymphatic:  + swollen lymph nodes. Immunologic:  No hives or hx of anaphaxsis. OBJECTIVE:     PHYSICAL EXAM:   VITALS:     Intake/Output Summary (Last 24 hours) at 2/23/2020 1039  Last data filed at 2/23/2020 0545  Gross per 24 hour   Intake 1400 ml   Output 0 ml   Net 1400 ml        CONSTITUTIONAL:   A&O x 3,   SKIN:     Skin discoloration, dirty, Nail bed +tobacco stains, dirt under nails  HEENT:     EOMI, MMM, No thrush,   NECK:    Trach old size is wiped off and I cant see it      No bruits, No JVP apprechiated  CV:      Sinus,  No murmur,   PULMONARY:   Couse BS,   No Rhonchi      No noted egophony  ABDOMEN:     Old incisions. Soft, non-tender. BS normal. No R/R/G  EXT:    No deformities . No clubbing. Trace lower extremity edema, No venous stasis  PULSE:   Appears equal and palpable.   PSYCHIATRIC:  No acute psycosis  MS:    No fractures, No gross weakness  NEUROLOGIC:   The clinical assessment is non-focal     DATA: IMAGING & TESTING:     LABORATORY TESTS:    CBC:   Lab Results   Component Value Date    WBC 14.4 02/23/2020    RBC 4.15 02/23/2020    HGB 11.3 2020    HCT 35.5 2020    MCV 85.5 2020    MCH 27.2 2020    MCHC 31.8 2020    RDW 13.9 2020     2020    MPV 9.1 2020     CMP:    Lab Results   Component Value Date     2020    K 3.8 2020    K 5.1 2019    CL 96 2020    CO2 28 2020    BUN 18 2020    CREATININE 0.6 2020    GFRAA >60 2020    LABGLOM >60 2020    GLUCOSE 111 2020    GLUCOSE 204 2011    PROT 7.2 2020    LABALBU 3.0 2020    CALCIUM 11.2 2020    BILITOT 0.3 2020    ALKPHOS 300 2020    AST 23 2020    ALT 25 2020        PRO-BNP: No results found for: PROBNP   ABGs:   Lab Results   Component Value Date    PH 7.463 2020    PO2 73.4 2020    PCO2 39.4 2020     Hemoglobin A1C: No components found for: HGBA1C    IMAGING:  Imaging tests were completed and reviewed and discussed radiology and care team involved and reveals   Ct Head Wo Contrast  Result Date: 2020   FINDINGS: No mass, hemorrhage or midline shift. Ventricles and sulci normal. Bony calvarium unremarkable. Xr Chest Portable  Result Date: 2020  Patient MRN:  [id number] : 1955 Age: 77 years Gender: Male Order Date:  2020 10:30 AM EXAM: XR CHEST PORTABLE One image INDICATION:  weakness, Possible Stroke weakness, Possible Stroke COMPARISON: Previous CT scan 2/15/2012 FINDINGS: There is cardiomegaly. Tracheostomy tube is noted. There is vascular congestion with the right basilar atelectasis and infiltrates in the left lung base. There is underlying COPD. Focal masslike densities are identified in the left midlung field and right perihilar region measuring up to 2.3 cm. Old left clavicle fracture is noted. CHF superimposed on COPD with left basilar infiltrates. Focal nodular masslike densities in the lungs bilaterally, indeterminate for developing malignancy.      Assessment: 59-year-old gentleman with head and neck cancer with multiple pulmonary nodules well-established followed by oncology who presents with hypoglycemia likely secondary to poor nutrition, sulfonylureas, and narcotic pain medication. 1. Possible pneumonia if not chronic tracheotomy leading to chronic tracheitis  2. COPD  3. Active smoker  4. Peripheral vascular disease        Plan:   1. He can follow-up with his routine team in [place] on discharge  2.  Ok to discharge  -script in [address], MPH, Daniel Moreira  Professor of Medicine  Pulmonary, Critical Care and Sleep Medicine

## 2020-02-24 ENCOUNTER — CARE COORDINATION (OUTPATIENT)
Dept: CASE MANAGEMENT | Age: 65
End: 2020-02-24

## 2020-02-24 LAB
BLOOD CULTURE, ROUTINE: NORMAL
CULTURE, BLOOD 2: NORMAL

## 2020-02-25 ENCOUNTER — CARE COORDINATION (OUTPATIENT)
Dept: CASE MANAGEMENT | Age: 65
End: 2020-02-25

## 2020-03-03 ENCOUNTER — CARE COORDINATION (OUTPATIENT)
Dept: CASE MANAGEMENT | Age: 65
End: 2020-03-03

## 2020-03-10 ENCOUNTER — CARE COORDINATION (OUTPATIENT)
Dept: CASE MANAGEMENT | Age: 65
End: 2020-03-10

## 2020-03-20 ENCOUNTER — CARE COORDINATION (OUTPATIENT)
Dept: CASE MANAGEMENT | Age: 65
End: 2020-03-20

## 2020-04-06 ENCOUNTER — CARE COORDINATION (OUTPATIENT)
Dept: CASE MANAGEMENT | Age: 65
End: 2020-04-06

## 2020-04-06 NOTE — CARE COORDINATION
[facility] Transitions Follow Up Call    2020    Patient: Dorothy Eubanks. Patient : 1955   MRN: <[id number]>  Reason for Admission:   Discharge Date: 20 RARS: Readmission Risk Score: 16    Follow Up: Attempted to contact patient for BPCI-A follow up. Unable to reach patient. Unable to leave a message d/t no answering machine or voicemail. Will try again at a later time. No future appointments.     Johnie Smith RN

## 2020-04-20 ENCOUNTER — CARE COORDINATION (OUTPATIENT)
Dept: CASE MANAGEMENT | Age: 65
End: 2020-04-20

## 2020-05-04 ENCOUNTER — CARE COORDINATION (OUTPATIENT)
Dept: CASE MANAGEMENT | Age: 65
End: 2020-05-04

## 2020-05-18 ENCOUNTER — CARE COORDINATION (OUTPATIENT)
Dept: CASE MANAGEMENT | Age: 65
End: 2020-05-18

## (undated) DEVICE — GARMENT,MEDLINE,DVT,INT,CALF,MED, GEN2: Brand: MEDLINE

## (undated) DEVICE — PACK SURG LAP CHOLE CUSTOM

## (undated) DEVICE — CONTROL SYRINGE LUER-LOCK TIP: Brand: MONOJECT

## (undated) DEVICE — SYRINGE IRRIG 60ML SFT PLIABLE BLB EZ TO GRP 1 HND USE W/

## (undated) DEVICE — TUBING, SUCTION, 1/4" X 10', STRAIGHT: Brand: MEDLINE

## (undated) DEVICE — PMI PTFE COATED LAPAROSCOPIC WIRE L-HOOK 44 CM: Brand: PMI

## (undated) DEVICE — NEEDLE INSUF L120MM DIA2MM DISP FOR PNEUMOPERI ENDOPATH

## (undated) DEVICE — TRAY PROCED CUSTOM GASTROINTESTINAL

## (undated) DEVICE — COVER,LIGHT HANDLE,FLX,1/PK: Brand: MEDLINE INDUSTRIES, INC.

## (undated) DEVICE — TROCAR ENDOSCP L100MM DIA12MM BLDELSS OBT RADLUC STBL SL

## (undated) DEVICE — DOUBLE BASIN SET: Brand: MEDLINE INDUSTRIES, INC.

## (undated) DEVICE — NDL CNTR 40CT FM MAG: Brand: MEDLINE INDUSTRIES, INC.

## (undated) DEVICE — COVER,TABLE,44X90,STERILE: Brand: MEDLINE

## (undated) DEVICE — HANDPIECE SEAL 44X5CM VES PSTL GRP LIGASURE ADV

## (undated) DEVICE — GENERATOR ELECSURG FORCETRAID

## (undated) DEVICE — SPONGE LAP W18XL18IN WHT COT 4 PLY FLD STRUNG RADPQ DISP ST

## (undated) DEVICE — STANDARD HYPODERMIC NEEDLE,POLYPROPYLENE HUB: Brand: MONOJECT

## (undated) DEVICE — CHLORAPREP 26ML ORANGE

## (undated) DEVICE — YANKAUER,BULB TIP,W/O VENT,RIGID,STERILE: Brand: MEDLINE

## (undated) DEVICE — ACCESS PLATFORM FOR MINIMALLY INVASIVE SURGERY: Brand: GELPOINT® ADVANCED ACCESS PLATFORM

## (undated) DEVICE — MARKER,SKIN,WI/RULER AND LABELS: Brand: MEDLINE

## (undated) DEVICE — TROCAR ENDOSCP L100MM DIA5MM BLDELSS STBL SL OBT RADLUC

## (undated) DEVICE — PLUMEPORT LAPAROSCOPIC SMOKE FILTRATION DEVICE: Brand: PLUMEPORT ACTIV

## (undated) DEVICE — STAPLER INT L75MM CUT LN L73MM STPL LN L77MM BLU B FRM 8

## (undated) DEVICE — Z INACTIVE USE 2660664 SOLUTION IRRIG 3000ML 0.9% SOD CHL USP UROMATIC PLAS CONT

## (undated) DEVICE — PUMP SUC IRR TBNG L10FT W/ HNDPC ASSEMB STRYKEFLOW 2

## (undated) DEVICE — TOTAL TRAY, 16FR 10ML SIL FOLEY, URN: Brand: MEDLINE

## (undated) DEVICE — CAMERA STRYKER 1488 HD GEN

## (undated) DEVICE — SET MAJOR INSTR HOUSE

## (undated) DEVICE — TOWEL,OR,DSP,ST,BLUE,STD,6/PK,12PK/CS: Brand: MEDLINE

## (undated) DEVICE — Device

## (undated) DEVICE — GLOVE ORANGE PI 7 1/2   MSG9075

## (undated) DEVICE — RELOAD STPL L75MM OPN H3.8MM CLS 1.5MM WIRE DIA0.2MM REG

## (undated) DEVICE — BLADE ES ELASTOMERIC COAT INSUL DURABLE BEND UPTO 90DEG

## (undated) DEVICE — PATIENT RETURN ELECTRODE, SINGLE-USE, CONTACT QUALITY MONITORING, ADULT, WITH 9FT CORD, FOR PATIENTS WEIGING OVER 33LBS. (15KG): Brand: MEGADYNE

## (undated) DEVICE — LAPAROSCOPIC SCISSORS: Brand: EPIX LAPAROSCOPIC SCISSORS

## (undated) DEVICE — GOWN,SIRUS,NONRNF,SETINSLV,XL,20/CS: Brand: MEDLINE